# Patient Record
Sex: MALE | Race: WHITE | NOT HISPANIC OR LATINO
[De-identification: names, ages, dates, MRNs, and addresses within clinical notes are randomized per-mention and may not be internally consistent; named-entity substitution may affect disease eponyms.]

---

## 2021-03-17 ENCOUNTER — TRANSCRIPTION ENCOUNTER (OUTPATIENT)
Age: 71
End: 2021-03-17

## 2022-04-05 ENCOUNTER — APPOINTMENT (OUTPATIENT)
Dept: ORTHOPEDIC SURGERY | Facility: CLINIC | Age: 72
End: 2022-04-05
Payer: MEDICARE

## 2022-04-05 VITALS — BODY MASS INDEX: 32.2 KG/M2 | WEIGHT: 230 LBS | HEIGHT: 71 IN

## 2022-04-05 DIAGNOSIS — E78.00 PURE HYPERCHOLESTEROLEMIA, UNSPECIFIED: ICD-10-CM

## 2022-04-05 DIAGNOSIS — I10 ESSENTIAL (PRIMARY) HYPERTENSION: ICD-10-CM

## 2022-04-05 PROCEDURE — 73030 X-RAY EXAM OF SHOULDER: CPT | Mod: RT

## 2022-04-05 PROCEDURE — J3490M: CUSTOM

## 2022-04-05 PROCEDURE — 73562 X-RAY EXAM OF KNEE 3: CPT | Mod: RT

## 2022-04-05 PROCEDURE — 20611 DRAIN/INJ JOINT/BURSA W/US: CPT | Mod: 50

## 2022-04-05 PROCEDURE — 99215 OFFICE O/P EST HI 40 MIN: CPT | Mod: 25

## 2022-04-05 RX ORDER — PANTOPRAZOLE SODIUM 40 MG/1
40 GRANULE, DELAYED RELEASE ORAL
Refills: 0 | Status: ACTIVE | COMMUNITY

## 2022-04-05 RX ORDER — EZETIMIBE 10 MG/1
10 TABLET ORAL
Refills: 0 | Status: ACTIVE | COMMUNITY

## 2022-04-05 RX ORDER — OLMESARTAN MEDOXOMIL 20 MG/1
20 TABLET, FILM COATED ORAL
Refills: 0 | Status: ACTIVE | COMMUNITY

## 2022-04-05 NOTE — REASON FOR VISIT
[FreeTextEntry2] : pt is here today for a follow up of his left shoulder. pt states the pain has gotten worse. pt states his level of pain can get to a 10 but right now it is a 5.

## 2022-04-05 NOTE — PHYSICAL EXAM
[4 ___] : forward flexion 4[unfilled]/5 [4___] : internal rotation 4[unfilled]/5 [TWNoteComboBox7] : active forward flexion 160 degrees [TWNoteComboBox6] : internal rotation 50 degrees [Bilateral] : shoulder bilaterally [Degenerative change] : Degenerative change [5___] : hamstring 5[unfilled]/5 [] : patient ambulates without assistive device [Right] : right knee [FreeTextEntry9] : 4 views reviewed which show some early lucencies around components

## 2022-04-05 NOTE — DISCUSSION/SUMMARY
[Medication Risks Reviewed] : Medication risks reviewed [Surgical risks reviewed] : Surgical risks reviewed [de-identified] : luis manuel rtc arthropathy discussed injeciotn to decreaseinfalmamitona dn pain \par knee would get anmri and boen scn to eval for losening \par

## 2022-04-05 NOTE — DISCUSSION/SUMMARY
[Medication Risks Reviewed] : Medication risks reviewed [Surgical risks reviewed] : Surgical risks reviewed [de-identified] : luis manuel rtc arthropathy discussed injeciotn to decreaseinfalmamitona dn pain \par knee would get anmri and boen scn to eval for losening \par

## 2022-04-05 NOTE — PROCEDURE
[Large Joint Injection] : Large joint injection [Shoulder] : shoulder [Call if redness, pain or fever occur] : call if redness, pain or fever occur [Apply ice for 15min out of every hour for the next 12-24 hours as tolerated] : apply ice for 15 minutes out of every hour for the next 12-24 hours as tolerated [Precise injection in area of tear] : precise injection in area of tear [Pain] : pain [Betadine] : betadine [Ethyl Chloride sprayed topically] : ethyl chloride sprayed topically [Sterile technique used] : sterile technique used [Bilateral] : bilaterally of the [Subacromial Space] : subacromial space [___ cc    6mg] :  Betamethasone (Celestone) ~Vcc of 6mg [___ cc    0.5%] : Bupivacaine (Marcaine) ~Vcc of 0.5%  [Inflammation] : inflammation [Visualize tear] : visualize tear

## 2022-04-06 ENCOUNTER — TRANSCRIPTION ENCOUNTER (OUTPATIENT)
Age: 72
End: 2022-04-06

## 2022-04-06 ENCOUNTER — APPOINTMENT (OUTPATIENT)
Dept: MRI IMAGING | Facility: CLINIC | Age: 72
End: 2022-04-06
Payer: MEDICARE

## 2022-04-06 PROCEDURE — 73721 MRI JNT OF LWR EXTRE W/O DYE: CPT | Mod: RT,MH

## 2022-04-11 ENCOUNTER — RESULT REVIEW (OUTPATIENT)
Age: 72
End: 2022-04-11

## 2022-04-27 ENCOUNTER — APPOINTMENT (OUTPATIENT)
Dept: ORTHOPEDIC SURGERY | Facility: CLINIC | Age: 72
End: 2022-04-27
Payer: MEDICARE

## 2022-04-27 VITALS — HEIGHT: 71 IN | BODY MASS INDEX: 32.2 KG/M2 | WEIGHT: 230 LBS

## 2022-04-27 PROCEDURE — 99213 OFFICE O/P EST LOW 20 MIN: CPT

## 2022-04-27 PROCEDURE — 99214 OFFICE O/P EST MOD 30 MIN: CPT

## 2022-04-29 NOTE — PHYSICAL EXAM
[Bilateral] : shoulder bilaterally [4 ___] : forward flexion 4[unfilled]/5 [4___] : internal rotation 4[unfilled]/5 [TWNoteComboBox7] : active forward flexion 160 degrees [TWNoteComboBox6] : internal rotation 50 degrees [Right] : right knee [5___] : hamstring 5[unfilled]/5 [] : non-antalgic

## 2022-04-29 NOTE — DISCUSSION/SUMMARY
[Medication Risks Reviewed] : Medication risks reviewed [Surgical risks reviewed] : Surgical risks reviewed [de-identified] : knee looks good, had discussion about balloon arthroplasty for shoulder and risks of surgery, will think about it and discuss on return, new exrays at that time to determine if good candidate\par The risks and benefits of surgery have been discussed. Risks include but are not limited to bleeding, infection, reaction to anesthesia, injury to blood vessels and nerves, malunion, nonunion, DVT, PE, necessity of repeat surgery, chronic pain, loss of limb and death. The patient understands the risks and has chosen to proceed with conservative care. All questions have been answered.\par follow up 6 weeks sooner if any issue\par

## 2022-04-29 NOTE — DATA REVIEWED
[MRI] : MRI [Left] : left [Knee] : knee [I independently reviewed and interpreted images and report] : I independently reviewed and interpreted images and report [FreeTextEntry1] : no significant abnormality

## 2022-04-29 NOTE — HISTORY OF PRESENT ILLNESS
[de-identified] : Here for follow up on the MRI results right knee. Feeling ok, some discomfort after golfing, more on the lateral side of the knee.

## 2022-06-22 ENCOUNTER — APPOINTMENT (OUTPATIENT)
Dept: ORTHOPEDIC SURGERY | Facility: CLINIC | Age: 72
End: 2022-06-22

## 2022-07-13 ENCOUNTER — NON-APPOINTMENT (OUTPATIENT)
Age: 72
End: 2022-07-13

## 2022-07-13 ENCOUNTER — APPOINTMENT (OUTPATIENT)
Dept: ORTHOPEDIC SURGERY | Facility: CLINIC | Age: 72
End: 2022-07-13
Payer: MEDICARE

## 2022-07-13 VITALS — HEIGHT: 71 IN | BODY MASS INDEX: 32.2 KG/M2 | WEIGHT: 230 LBS

## 2022-07-13 DIAGNOSIS — M19.012 PRIMARY OSTEOARTHRITIS, RIGHT SHOULDER: ICD-10-CM

## 2022-07-13 DIAGNOSIS — M19.011 PRIMARY OSTEOARTHRITIS, RIGHT SHOULDER: ICD-10-CM

## 2022-07-13 DIAGNOSIS — M25.512 PAIN IN RIGHT SHOULDER: ICD-10-CM

## 2022-07-13 DIAGNOSIS — M25.511 PAIN IN RIGHT SHOULDER: ICD-10-CM

## 2022-07-13 DIAGNOSIS — M19.019 PRIMARY OSTEOARTHRITIS, UNSPECIFIED SHOULDER: ICD-10-CM

## 2022-07-13 PROCEDURE — 99214 OFFICE O/P EST MOD 30 MIN: CPT | Mod: 25

## 2022-07-13 PROCEDURE — 20611 DRAIN/INJ JOINT/BURSA W/US: CPT | Mod: 50

## 2022-07-13 PROCEDURE — 99072 ADDL SUPL MATRL&STAF TM PHE: CPT | Mod: 1L

## 2022-07-13 PROCEDURE — J3490M: CUSTOM | Mod: 1L

## 2022-07-13 RX ORDER — AMOXICILLIN AND CLAVULANATE POTASSIUM 875; 125 MG/1; MG/1
875-125 TABLET, COATED ORAL
Qty: 14 | Refills: 0 | Status: DISCONTINUED | COMMUNITY
Start: 2022-01-31

## 2022-07-13 RX ORDER — METHYLPREDNISOLONE 4 MG/1
4 TABLET ORAL
Qty: 21 | Refills: 0 | Status: DISCONTINUED | COMMUNITY
Start: 2022-01-31

## 2022-07-13 RX ORDER — EVOLOCUMAB 140 MG/ML
140 INJECTION, SOLUTION SUBCUTANEOUS
Refills: 0 | Status: ACTIVE | COMMUNITY

## 2022-07-13 RX ORDER — ROSUVASTATIN CALCIUM 40 MG/1
40 TABLET, FILM COATED ORAL
Refills: 0 | Status: DISCONTINUED | COMMUNITY
End: 2022-07-13

## 2022-09-26 ENCOUNTER — LABORATORY RESULT (OUTPATIENT)
Age: 72
End: 2022-09-26

## 2022-09-28 RX ORDER — OXYCODONE AND ACETAMINOPHEN 10; 325 MG/1; MG/1
10-325 TABLET ORAL
Qty: 40 | Refills: 0 | Status: ACTIVE | COMMUNITY
Start: 2022-09-28

## 2022-09-29 ENCOUNTER — APPOINTMENT (OUTPATIENT)
Age: 72
End: 2022-09-29
Payer: MEDICARE

## 2022-09-29 PROCEDURE — 29828 SHO ARTHRS SRG BICP TENODSIS: CPT | Mod: LT

## 2022-09-29 PROCEDURE — 29823 SHO ARTHRS SRG XTNSV DBRDMT: CPT | Mod: AS,LT,ACP

## 2022-09-29 PROCEDURE — 29828 SHO ARTHRS SRG BICP TENODSIS: CPT | Mod: ACP

## 2022-09-29 PROCEDURE — 29823 SHO ARTHRS SRG XTNSV DBRDMT: CPT | Mod: LT

## 2022-09-29 PROCEDURE — 29999 UNLISTED PX ARTHROSCOPY: CPT

## 2022-09-29 PROCEDURE — 29999 UNLISTED PX ARTHROSCOPY: CPT | Mod: ACP,AS

## 2022-10-05 ENCOUNTER — APPOINTMENT (OUTPATIENT)
Dept: ORTHOPEDIC SURGERY | Facility: CLINIC | Age: 72
End: 2022-10-05

## 2022-10-05 VITALS — BODY MASS INDEX: 32.2 KG/M2 | WEIGHT: 230 LBS | HEIGHT: 71 IN

## 2022-10-05 PROCEDURE — 99024 POSTOP FOLLOW-UP VISIT: CPT

## 2022-10-06 RX ORDER — CEPHALEXIN 500 MG/1
500 CAPSULE ORAL 4 TIMES DAILY
Qty: 40 | Refills: 0 | Status: ACTIVE | COMMUNITY
Start: 2022-10-06 | End: 1900-01-01

## 2022-10-09 NOTE — DISCUSSION/SUMMARY
[de-identified] : 1st post op RTC repair/replacement with balloon 9/29- wound clean, dry and intact, no drainage, normal appearance, neuro and vascular exam normal, skin intact and normal healing  lateral portal some fluid no erythema want it to drain will call or text me if any issues \par sutures and steristrips applied \par went over the op report and discussed the next post operative steps including the precautions patient should take for normal healing \par will start physical therapy next week \par \par

## 2022-10-09 NOTE — HISTORY OF PRESENT ILLNESS
[de-identified] : patient is here for 1st po Visit of the left shoulder. pt had arthroscopy RTC repair  sx in the left shoulder on 9/29/22. the pain is the worse during the night. pt has been taking Aleve to help with the pain in the right shoulder.   pt has been having trouble sleeping due to the pain in the left shoulder.

## 2022-10-10 ENCOUNTER — APPOINTMENT (OUTPATIENT)
Dept: ORTHOPEDIC SURGERY | Facility: CLINIC | Age: 72
End: 2022-10-10

## 2022-10-10 VITALS — BODY MASS INDEX: 32.2 KG/M2 | HEIGHT: 71 IN | WEIGHT: 230 LBS

## 2022-10-10 PROCEDURE — 20610 DRAIN/INJ JOINT/BURSA W/O US: CPT

## 2022-10-10 PROCEDURE — 99024 POSTOP FOLLOW-UP VISIT: CPT

## 2022-10-11 NOTE — PHYSICAL EXAM
[Left] : left shoulder [] : sero-sanguinous drainage [FreeTextEntry3] : lateral portal still prominent and feels like fluid underneath but no redness or other signs of infection , stopped draining

## 2022-10-11 NOTE — DISCUSSION/SUMMARY
[Surgical risks reviewed] : Surgical risks reviewed [de-identified] :  RTC repair sx in the left shoulder on 9/29/22- normal course with good progress however there was history of drainage so i aspirated portal and was not able to get anything , no collection though did drain sero sanguous fluid after apiration, will continue keflex for prophylaxis and follow next week, sooner if any change\par \par cont with the physical therapy\par follow up in 1 month unless the wound starts to drain again

## 2022-10-11 NOTE — PROCEDURE
[Large Joint Injection] : Large joint injection [Left] : of the left [Shoulder] : shoulder [Pain] : pain [Inflammation] : inflammation [Betadine] : betadine [Effusion] : effusion [Call if redness, pain or fever occur] : call if redness, pain or fever occur [Apply ice for 15min out of every hour for the next 12-24 hours as tolerated] : apply ice for 15 minutes out of every hour for the next 12-24 hours as tolerated [Patient was advised to rest the joint(s) for ____ days] : patient was advised to rest the joint(s) for [unfilled] days [de-identified] : 0

## 2022-10-11 NOTE — HISTORY OF PRESENT ILLNESS
[de-identified] : patient is here for 2nd po Visit of the left shoulder. pt had arthroscopy RTC repair sx in the left shoulder on 9/29/22.pt has an infection in the left shoulder where sx was performed. the pain in the left shoulder has not been too great due to certain movements and cause  the  pain to be worse.pt has started doing pt. pt has been icing  the left shoulder. pt has been having trouble sleeping at night due to the pain in  the left shoulder.

## 2022-10-19 ENCOUNTER — APPOINTMENT (OUTPATIENT)
Dept: ORTHOPEDIC SURGERY | Facility: CLINIC | Age: 72
End: 2022-10-19

## 2022-10-19 VITALS — BODY MASS INDEX: 32.2 KG/M2 | HEIGHT: 71 IN | WEIGHT: 230 LBS

## 2022-10-19 PROCEDURE — 99024 POSTOP FOLLOW-UP VISIT: CPT

## 2022-10-23 NOTE — PHYSICAL EXAM
[Left] : left shoulder [] : no purulent drainage [FreeTextEntry3] : lateral portal still prominent and feels like fluid underneath but no redness or other signs of infection , stopped draining

## 2022-10-23 NOTE — DISCUSSION/SUMMARY
[de-identified] : 9/29/22 rtc repair/ replacement w/ balloon - normal course with good progress and no evidence of infection, continue rehab and appropriate nsaids\par patient has been going to physical therapy and is working his range of motion, patient is still not able to rotate. \par follow 4 weeks

## 2022-10-23 NOTE — HISTORY OF PRESENT ILLNESS
[de-identified] : patient is here for a post op visit on te left shoulder. patient is feeling good and has no issues. patient notes weakness but pain is fine. patient notes the incision from the aspiration last visit has closed up and is fine now. patient also notes therapy is going well. 9/29/22 rtc repair/ replacement w/ balloon

## 2022-11-15 ENCOUNTER — APPOINTMENT (OUTPATIENT)
Dept: ORTHOPEDIC SURGERY | Facility: CLINIC | Age: 72
End: 2022-11-15

## 2022-11-15 VITALS — HEIGHT: 71 IN | WEIGHT: 230 LBS | BODY MASS INDEX: 32.2 KG/M2

## 2022-11-15 PROCEDURE — 20606 DRAIN/INJ JOINT/BURSA W/US: CPT | Mod: 58,LT

## 2022-11-15 PROCEDURE — 99024 POSTOP FOLLOW-UP VISIT: CPT

## 2022-11-15 PROCEDURE — J3490N: CUSTOM

## 2022-11-15 NOTE — PROCEDURE
[Medium Joint Injection] : medium joint injection [Left] : of the left [Inflammation] : inflammation [X-ray evidence of Osteoarthritis on this or prior visit] : x-ray evidence of Osteoarthritis on this or prior visit [Betadine] : betadine [Ethyl Chloride sprayed topically] : ethyl chloride sprayed topically [Sterile technique used] : sterile technique used [___ cc    3mg] :  Betamethasone (Celestone) ~Vcc of 3mg [___ cc    0.25%] : Bupivacaine (Marcaine) ~Vcc of 0.25%  [] : Patient tolerated procedure well [Call if redness, pain or fever occur] : call if redness, pain or fever occur [Apply ice for 15min out of every hour for the next 12-24 hours as tolerated] : apply ice for 15 minutes out of every hour for the next 12-24 hours as tolerated [Previous OTC use and PT nontherapeutic] : patient has tried OTC's including aspirin, Ibuprofen, Aleve, etc or prescription NSAIDS, and/or exercises at home and/or physical therapy without satisfactory response [Risks, benefits, alternatives discussed / Verbal consent obtained] : the risks benefits, and alternatives have been discussed, and verbal consent was obtained [Precise injection in area of tear] : precise injection in area of tear [All ultrasound images have been permanently captured and stored accordingly in our picture archiving and communication system] : All ultrasound images have been permanently captured and stored accordingly in our picture archiving and communication system [Visualization of the needle and placement of injection was performed without complication] : visualization of the needle and placement of injection was performed without complication [Pain] : pain [AC Joint] : ac joint

## 2022-11-19 NOTE — HISTORY OF PRESENT ILLNESS
[de-identified] : Pt is here for a post op visit from sx on 09/29/22 - left shoulder RTC repair/replacement with balloon. Pt is currently doing physical therapy at [facility] in [place] 2x a week, and finds it does assist pain. Pt states the burning sensation in shoulder has no improved. Pt states pain continues to radiates down to elbow, and to left shoulder blade. Pt states discomfort while sleeping, and unable to sleep on left side. Pt states ROM has not improved. Pt is not taking medications, and ices after activity.

## 2022-11-19 NOTE — DISCUSSION/SUMMARY
[de-identified] : 9/29/22 rtc repair/ replacement w/ balloon - normal course with good progress and no evidence of infection, continue rehab and appropriate nsaids\par patient has been going to physical therapy but does not see improvement in his range of motion, he is stiff therefore recommend injecting into the AC joint today to help relieve inflammation and stiffness hopefully to improve his rom in therapy \par \par \par The risks, benefits and contents of the injection have been discussed.  Risks include but are not limited to allergic reaction, flare reaction, permanent white skin discoloration at the injection site and infection.  The patient understands the risks and agrees to having the injection.  All questions have been answered.\par LT AC joint Discussed the timing of the injections and the follow up that is needed. Advised the pt to ice the area that was injected and informed the pt it may take a few days for the injection to give relief.\par \par encourage him to cont with the therapy and precautions \par follow up 4 weeks

## 2022-12-12 ENCOUNTER — APPOINTMENT (OUTPATIENT)
Dept: ORTHOPEDIC SURGERY | Facility: CLINIC | Age: 72
End: 2022-12-12
Payer: OTHER MISCELLANEOUS

## 2022-12-12 VITALS — BODY MASS INDEX: 32.2 KG/M2 | WEIGHT: 230 LBS | HEIGHT: 71 IN

## 2022-12-12 PROCEDURE — 73030 X-RAY EXAM OF SHOULDER: CPT | Mod: LT

## 2022-12-12 PROCEDURE — 20611 DRAIN/INJ JOINT/BURSA W/US: CPT | Mod: 58,LT

## 2022-12-12 PROCEDURE — 99024 POSTOP FOLLOW-UP VISIT: CPT

## 2022-12-12 PROCEDURE — J3490N: CUSTOM | Mod: NC

## 2022-12-12 NOTE — HISTORY OF PRESENT ILLNESS
[de-identified] : patient is here for   apo visit of the left shoulder RTC repair/with balloon  DOS: 9/29/22.  the pain in the left shoulder has been bad. pt has been using icyhot and aleve to help alleviate the  pain. pt stopped  doing physical therapy last week. the ROM has not improved.

## 2022-12-12 NOTE — DISCUSSION/SUMMARY
[de-identified] : RTC repair/with balloon  DOS: 9/29/22.- he has passive ROM but lacking in strength \par discussed risks and benefits of visco injections into the joint, disussed potential shoulder repleaceent but will hold off \par \par evaluated shoulder and decided to proceed with synvisc injections, discussed future treatment and option, will proceed, discussed possible surgery vs alternatives in future\par The risks, benefits and contents of the injection have been discussed.  Risks include but are not limited to allergic reaction, flare reaction, permanent white skin discoloration at the injection site and infection.  The patient understands the risks and agrees to having the injection.  All questions have been answered.\par \par LT shoulder GH joint Synvisc #1 Discussed the timing of the injections and the follow up that is needed. Advised the pt to ice the area that was injected and informed the pt it may take a few days for the injection to give relief.\par follow up in 1 week

## 2022-12-12 NOTE — PROCEDURE
[Medium Joint Injection] : medium joint injection [Left] : of the left [Inflammation] : inflammation [X-ray evidence of Osteoarthritis on this or prior visit] : x-ray evidence of Osteoarthritis on this or prior visit [Betadine] : betadine [Ethyl Chloride sprayed topically] : ethyl chloride sprayed topically [Sterile technique used] : sterile technique used [___ cc    3mg] :  Betamethasone (Celestone) ~Vcc of 3mg [___ cc    0.25%] : Bupivacaine (Marcaine) ~Vcc of 0.25%  [Synvisc (16mg)] : 16mg of Synvisc [#1] : series #1 [] : Patient tolerated procedure well [Call if redness, pain or fever occur] : call if redness, pain or fever occur [Apply ice for 15min out of every hour for the next 12-24 hours as tolerated] : apply ice for 15 minutes out of every hour for the next 12-24 hours as tolerated [Previous OTC use and PT nontherapeutic] : patient has tried OTC's including aspirin, Ibuprofen, Aleve, etc or prescription NSAIDS, and/or exercises at home and/or physical therapy without satisfactory response [Risks, benefits, alternatives discussed / Verbal consent obtained] : the risks benefits, and alternatives have been discussed, and verbal consent was obtained [Precise injection in area of tear] : precise injection in area of tear [All ultrasound images have been permanently captured and stored accordingly in our picture archiving and communication system] : All ultrasound images have been permanently captured and stored accordingly in our picture archiving and communication system [Visualization of the needle and placement of injection was performed without complication] : visualization of the needle and placement of injection was performed without complication [Pain] : pain [Glenohumeral Joint] : glenohumeral joint

## 2022-12-19 ENCOUNTER — APPOINTMENT (OUTPATIENT)
Dept: ORTHOPEDIC SURGERY | Facility: CLINIC | Age: 72
End: 2022-12-19
Payer: MEDICARE

## 2022-12-19 VITALS — BODY MASS INDEX: 32.2 KG/M2 | WEIGHT: 230 LBS | HEIGHT: 71 IN

## 2022-12-19 DIAGNOSIS — Z96.651 PAIN DUE TO INTERNAL ORTHOPEDIC PROSTHETIC DEVICES, IMPLANTS AND GRAFTS, INITIAL ENCOUNTER: ICD-10-CM

## 2022-12-19 DIAGNOSIS — T84.84XA PAIN DUE TO INTERNAL ORTHOPEDIC PROSTHETIC DEVICES, IMPLANTS AND GRAFTS, INITIAL ENCOUNTER: ICD-10-CM

## 2022-12-19 DIAGNOSIS — Z00.00 ENCOUNTER FOR GENERAL ADULT MEDICAL EXAMINATION W/OUT ABNORMAL FINDINGS: ICD-10-CM

## 2022-12-19 PROCEDURE — 99214 OFFICE O/P EST MOD 30 MIN: CPT | Mod: 24,25

## 2022-12-19 PROCEDURE — 20611 DRAIN/INJ JOINT/BURSA W/US: CPT | Mod: 58,LT

## 2022-12-19 NOTE — PROCEDURE
[Medium Joint Injection] : medium joint injection [Left] : of the left [Glenohumeral Joint] : glenohumeral joint [Inflammation] : inflammation [X-ray evidence of Osteoarthritis on this or prior visit] : x-ray evidence of Osteoarthritis on this or prior visit [Betadine] : betadine [Ethyl Chloride sprayed topically] : ethyl chloride sprayed topically [Sterile technique used] : sterile technique used [___ cc    3mg] :  Betamethasone (Celestone) ~Vcc of 3mg [___ cc    0.25%] : Bupivacaine (Marcaine) ~Vcc of 0.25%  [Synvisc (16mg)] : 16mg of Synvisc [#2] : series #2 [] : Patient tolerated procedure well [Call if redness, pain or fever occur] : call if redness, pain or fever occur [Apply ice for 15min out of every hour for the next 12-24 hours as tolerated] : apply ice for 15 minutes out of every hour for the next 12-24 hours as tolerated [Previous OTC use and PT nontherapeutic] : patient has tried OTC's including aspirin, Ibuprofen, Aleve, etc or prescription NSAIDS, and/or exercises at home and/or physical therapy without satisfactory response [Risks, benefits, alternatives discussed / Verbal consent obtained] : the risks benefits, and alternatives have been discussed, and verbal consent was obtained [Precise injection in area of tear] : precise injection in area of tear [All ultrasound images have been permanently captured and stored accordingly in our picture archiving and communication system] : All ultrasound images have been permanently captured and stored accordingly in our picture archiving and communication system [Visualization of the needle and placement of injection was performed without complication] : visualization of the needle and placement of injection was performed without complication [Pain] : pain

## 2022-12-22 PROBLEM — T84.84XA PAINFUL TOTAL KNEE REPLACEMENT, RIGHT: Status: ACTIVE | Noted: 2022-04-05

## 2022-12-22 PROBLEM — Z00.00 ENCOUNTER FOR PREVENTIVE HEALTH EXAMINATION: Status: ACTIVE | Noted: 2022-04-01

## 2022-12-22 NOTE — DISCUSSION/SUMMARY
[de-identified] : RTC repair/with balloon  DOS: 9/29/22.- he has passive ROM but lacking in strength \par discussed risks and benefits of visco injections into the joint, disussed potential shoulder repleaceent but will hold off \par \par evaluated shoulder and decided to proceed with synvisc injections, discussed future treatment and option, will proceed, discussed possible surgery vs alternatives in future\par The risks, benefits and contents of the injection have been discussed.  Risks include but are not limited to allergic reaction, flare reaction, permanent white skin discoloration at the injection site and infection.  The patient understands the risks and agrees to having the injection.  All questions have been answered.\par \par LT shoulder GH joint Synvisc #1 Discussed the timing of the injections and the follow up that is needed. Advised the pt to ice the area that was injected and informed the pt it may take a few days for the injection to give relief.\par follow up in 1 week

## 2022-12-22 NOTE — HISTORY OF PRESENT ILLNESS
[de-identified] : Pt is here for a possible injection to the left shoulder. Had RTC repair/with balloon on 09/29/22. Pain has not improved since last visit. Noticed slight relief with Synvisc injection. ROM is not improving, limited to movement. Pain is most prevalent with extending arm, and sudden movements. Takes Aleve and uses icy hot when needed. Pt would like to proceed with Synvisc Injection #2 today to left shoulder.

## 2022-12-27 ENCOUNTER — APPOINTMENT (OUTPATIENT)
Dept: ORTHOPEDIC SURGERY | Facility: CLINIC | Age: 72
End: 2022-12-27
Payer: MEDICARE

## 2022-12-27 VITALS — HEIGHT: 71 IN | BODY MASS INDEX: 32.2 KG/M2 | WEIGHT: 230 LBS

## 2022-12-27 PROCEDURE — 99024 POSTOP FOLLOW-UP VISIT: CPT

## 2022-12-27 PROCEDURE — 20611 DRAIN/INJ JOINT/BURSA W/US: CPT | Mod: 58,LT

## 2022-12-27 NOTE — PROCEDURE
[Medium Joint Injection] : medium joint injection [Left] : of the left [Glenohumeral Joint] : glenohumeral joint [Inflammation] : inflammation [X-ray evidence of Osteoarthritis on this or prior visit] : x-ray evidence of Osteoarthritis on this or prior visit [Betadine] : betadine [Ethyl Chloride sprayed topically] : ethyl chloride sprayed topically [Sterile technique used] : sterile technique used [___ cc    3mg] :  Betamethasone (Celestone) ~Vcc of 3mg [___ cc    0.25%] : Bupivacaine (Marcaine) ~Vcc of 0.25%  [Synvisc (16mg)] : 16mg of Synvisc [#3] : series #3 [] : Patient tolerated procedure well [Call if redness, pain or fever occur] : call if redness, pain or fever occur [Apply ice for 15min out of every hour for the next 12-24 hours as tolerated] : apply ice for 15 minutes out of every hour for the next 12-24 hours as tolerated [Previous OTC use and PT nontherapeutic] : patient has tried OTC's including aspirin, Ibuprofen, Aleve, etc or prescription NSAIDS, and/or exercises at home and/or physical therapy without satisfactory response [Risks, benefits, alternatives discussed / Verbal consent obtained] : the risks benefits, and alternatives have been discussed, and verbal consent was obtained [Precise injection in area of tear] : precise injection in area of tear [All ultrasound images have been permanently captured and stored accordingly in our picture archiving and communication system] : All ultrasound images have been permanently captured and stored accordingly in our picture archiving and communication system [Visualization of the needle and placement of injection was performed without complication] : visualization of the needle and placement of injection was performed without complication [Pain] : pain

## 2023-01-23 PROBLEM — M19.011 ARTHRITIS OF BOTH SHOULDER REGIONS: Status: ACTIVE | Noted: 2022-07-13

## 2023-01-23 PROBLEM — M19.019 SHOULDER ARTHRITIS: Status: ACTIVE | Noted: 2023-01-23

## 2023-01-23 PROBLEM — M25.511 SHOULDER PAIN, BILATERAL: Status: ACTIVE | Noted: 2022-04-05

## 2023-01-23 NOTE — DISCUSSION/SUMMARY
[Medication Risks Reviewed] : Medication risks reviewed [Surgical risks reviewed] : Surgical risks reviewed [de-identified] : had an extensive discussion on balloon scope procedure for righ shoulders including the risks and benefits and the recovery process. both shoulders will have to be done a few months apart, start with the right. , understands he is not ideal candidate and this is a relatively new procedure however alternative is a reverse shoulder replacement and that has high morbidity\par The risks and benefits of surgery have been discussed. Risks include but are not limited to bleeding, infection, reaction to anesthesia, injury to blood vessels and nerves, malunion, nonunion, DVT, PE, necessity of repeat surgery, chronic pain, loss of limb and death. The patient understands the risks and agrees with the surgical plan. All questions have been answered.\par \par The risks, benefits and contents of the injection have been discussed.  Risks include but are not limited to allergic reaction, flare reaction, permanent white skin discoloration at the injection site and infection.  The patient understands the risks and agrees to having the injection.  All questions have been answered.\par celestone in [facility] Discussed the timing of the injections and the follow up that is needed. Advised the pt to ice the area that was injected and informed the pt it may take a few days for the injection to give relief.\par

## 2023-01-23 NOTE — HISTORY OF PRESENT ILLNESS
[de-identified] : patient is here for folow up ans possibly another celestone injection in right shoulders. last injections were in April. patient claims  injeciton helps for some time. patient claims to get about 2 months of relief from celestone injections.

## 2023-01-23 NOTE — PHYSICAL EXAM
[Left] : left shoulder [Sitting] : sitting [Moderate] : moderate [4 ___] : forward flexion 4[unfilled]/5 [4___] : internal rotation 4[unfilled]/5 [TWNoteComboBox7] : active forward flexion 160 degrees [TWNoteComboBox6] : internal rotation 50 degrees [Right] : right knee [5___] : hamstring 5[unfilled]/5 [] : patient ambulates without assistive device

## 2023-01-23 NOTE — PHYSICAL EXAM
[Bilateral] : shoulder bilaterally [4 ___] : forward flexion 4[unfilled]/5 [4___] : internal rotation 4[unfilled]/5 [5___] : hamstring 5[unfilled]/5 [Right] : right shoulder [Sitting] : sitting [Moderate] : moderate [TWNoteComboBox7] : active forward flexion 160 degrees [TWNoteComboBox6] : internal rotation 50 degrees [] : non-antalgic

## 2023-01-23 NOTE — DISCUSSION/SUMMARY
[de-identified] : RTC repair/with balloon  DOS: 9/29/22.- he has passive ROM but lacking in strength \par discussed risks and benefits of visco injections into the joint, disussed potential shoulder repleaceent but will hold off \par \par evaluated shoulder and decided to proceed with synvisc injections, discussed future treatment and option, will proceed, discussed possible surgery vs alternatives in future\par The risks, benefits and contents of the injection have been discussed.  Risks include but are not limited to allergic reaction, flare reaction, permanent white skin discoloration at the injection site and infection.  The patient understands the risks and agrees to having the injection.  All questions have been answered.\par \par LT shoulder GH joint Synvisc #3 Discussed the timing of the injections and the follow up that is needed. Advised the pt to ice the area that was injected and informed the pt it may take a few days for the injection to give relief.\par \par f/up in 6-8 weeks, discussed possible csi vs. zilretta. Also discussed possible future RSA.

## 2023-01-23 NOTE — HISTORY OF PRESENT ILLNESS
[de-identified] : Patient is here for a follow up of the left shoulder. Pt notes no improvement in the left shoulder since last visit. Pt is having constant pain in the left shoulder. Pt had Synvisc Injection #2 in the left shoulder last visit. Pt had RTC repair/with balloon 9/29/22. \par Pt would like to continue with Synvisc Injection #3 in the left shoulder today.

## 2023-01-23 NOTE — PROCEDURE
[Large Joint Injection] : Large joint injection [Bilateral] : bilaterally of the [Subacromial Space] : subacromial space [Inflammation] : inflammation [Betadine] : betadine [Ethyl Chloride sprayed topically] : ethyl chloride sprayed topically [Sterile technique used] : sterile technique used [___ cc    3mg] :  Betamethasone (Celestone) ~Vcc of 3mg [___ cc    0.25%] : Bupivacaine (Marcaine) ~Vcc of 0.25%  [] : Patient tolerated procedure well [Call if redness, pain or fever occur] : call if redness, pain or fever occur [Apply ice for 15min out of every hour for the next 12-24 hours as tolerated] : apply ice for 15 minutes out of every hour for the next 12-24 hours as tolerated [Previous OTC use and PT nontherapeutic] : patient has tried OTC's including aspirin, Ibuprofen, Aleve, etc or prescription NSAIDS, and/or exercises at home and/or physical therapy without satisfactory response [Risks, benefits, alternatives discussed / Verbal consent obtained] : the risks benefits, and alternatives have been discussed, and verbal consent was obtained [Precise injection in area of tear] : precise injection in area of tear [All ultrasound images have been permanently captured and stored accordingly in our picture archiving and communication system] : All ultrasound images have been permanently captured and stored accordingly in our picture archiving and communication system [Visualization of the needle and placement of injection was performed without complication] : visualization of the needle and placement of injection was performed without complication [Pain] : pain [Right] : of the right

## 2023-01-23 NOTE — DISCUSSION/SUMMARY
[Medication Risks Reviewed] : Medication risks reviewed [Surgical risks reviewed] : Surgical risks reviewed [de-identified] : had an extensive discussion on balloon scope procedure for left shoulders including the risks and benefits and the recovery process. both shoulders will have to be done a few months apart, start with the right. , understands he is not ideal candidate and this is a relatively new procedure however alternative is a reverse shoulder replacement and that has high morbidity\par The risks and benefits of surgery have been discussed. Risks include but are not limited to bleeding, infection, reaction to anesthesia, injury to blood vessels and nerves, malunion, nonunion, DVT, PE, necessity of repeat surgery, chronic pain, loss of limb and death. The patient understands the risks and agrees with the surgical plan. All questions have been answered.\par \par The risks, benefits and contents of the injection have been discussed.  Risks include but are not limited to allergic reaction, flare reaction, permanent white skin discoloration at the injection site and infection.  The patient understands the risks and agrees to having the injection.  All questions have been answered.\par celestone in left SHOULDERS Discussed the timing of the injections and the follow up that is needed. Advised the pt to ice the area that was injected and informed the pt it may take a few days for the injection to give relief.\par

## 2023-01-23 NOTE — HISTORY OF PRESENT ILLNESS
[de-identified] : patient is here for folow up ans possibly another celestone injection in left shoulders. last injections were in April. patient claims  injeciton helps for some time. patient claims to get about 2 months of relief from celestone injections.

## 2023-02-14 ENCOUNTER — APPOINTMENT (OUTPATIENT)
Dept: ORTHOPEDIC SURGERY | Facility: CLINIC | Age: 73
End: 2023-02-14
Payer: MEDICARE

## 2023-02-14 VITALS — BODY MASS INDEX: 32.2 KG/M2 | WEIGHT: 230 LBS | HEIGHT: 71 IN

## 2023-02-14 DIAGNOSIS — Z98.890 OTHER SPECIFIED POSTPROCEDURAL STATES: ICD-10-CM

## 2023-02-14 PROCEDURE — 20611 DRAIN/INJ JOINT/BURSA W/US: CPT

## 2023-02-14 PROCEDURE — J3490M: CUSTOM | Mod: NC

## 2023-02-14 PROCEDURE — 99215 OFFICE O/P EST HI 40 MIN: CPT

## 2023-02-15 ENCOUNTER — FORM ENCOUNTER (OUTPATIENT)
Age: 73
End: 2023-02-15

## 2023-02-15 ENCOUNTER — APPOINTMENT (OUTPATIENT)
Dept: MRI IMAGING | Facility: CLINIC | Age: 73
End: 2023-02-15

## 2023-02-16 ENCOUNTER — APPOINTMENT (OUTPATIENT)
Dept: MRI IMAGING | Facility: CLINIC | Age: 73
End: 2023-02-16
Payer: OTHER MISCELLANEOUS

## 2023-02-16 PROCEDURE — 99072 ADDL SUPL MATRL&STAF TM PHE: CPT

## 2023-02-16 PROCEDURE — 73221 MRI JOINT UPR EXTREM W/O DYE: CPT | Mod: LT

## 2023-02-19 NOTE — PROCEDURE
[Large Joint Injection] : Large joint injection [Left] : of the left [Subacromial Space] : subacromial space [FreeTextEntry1] :  left shoulder subacromial 9/2 injection under US guidance [FreeTextEntry3] : Large joint injection was performed into the subacromial space of left shoulder. An injection of Bupivacaine (Marcaine) cc of 0.5% was used Betamethasone (Celestone) cc of 6mg. \par Patient was advised to call if redness, pain or fever occur and apply ice for 15 minutes out of every hour for the next 12-24 hours as tolerated. \par \par Patient has tried OTC's including aspirin, Ibuprofen, Aleve, etc or prescription NSAIDS, and/or exercises at home and/or physical therapy without satisfactory response and the risks benefits, and alternatives have been discussed, and verbal consent was obtained. \par Ultrasound guidance was indicated for this patient due to precise injection in area of tear. All ultrasound images have been permanently captured and stored accordingly in our picture archiving and communication system. Visualization of the needle and placement of injection was performed without complication. \par The findings showed no evidence of ligament, tendon or muscle tear and no effusion or other fluid collection.

## 2023-02-19 NOTE — HISTORY OF PRESENT ILLNESS
[de-identified] : Patient is here to follow up on left shoulder. Completed Synvisc series on 12/27/22, and felt no relief. Pain is still worse with lifting and extending arm. Not taking medications, icing, or heating. Had RTC repair with balloon implant on 09/29/22.

## 2023-02-19 NOTE — DISCUSSION/SUMMARY
[Medication Risks Reviewed] : Medication risks reviewed [Surgical risks reviewed] : Surgical risks reviewed [de-identified] : end stage rotator cuff arthropathy, needs a reverse shoulder replacement which has high morbidity, discussed risks and benefits of surgery\par We had an extensive discussion regarding total shoulder replacement surgery intervention including risk, benefits and alternatives.  The risks include, but are not limited to infection, bleeding, injury to small nerves and blood vessels, pain, stiffness, phlebitis, DVT and need for secondary procedures.  there are people despite well done surgery who can lose their leg or life as a result of unforeseen complications. Preoperative, intraoperative and postoperative care were discussed and outlined to the patient as well.  I have also talked to the patient about the specific risks of computer assisted surgery and robotic surgery including the reported risk of malalignment, increased injury to neurovascular structures; ligamentous structure with minimally invasive computer assisted surgery as well as iatrogenic injury from the pin sites, risks of fracture around the pins or computer error.  In my opinion the benefits outweigh the risk.  The patient would like to proceed with this.\par will get an mri as a last ditch effort to see if anyway to avoid and understand what balloon has achieved\par prescribed mobic and discussed risks of side effects and timing and management of medication.  side effects can include gi ulcers and irritation as well as kidney failure and bleeding issues\par decided he would take prescription motrin instead\par Tests/Studies Independently Interpreted Today: None \par Tests Ordered: MRI left shoulder \par \par Activity/Work/Sports Status: None \par Additional Instructions: None\par Follow-Up:  After MRI \par \par The risks, benefits and contents of the injection have been discussed.  Risks include but are not limited to allergic reaction, flare reaction, permanent white skin discoloration at the injection site and infection.  The patient understands the risks and agrees to having the injection.  All questions have been answered.\par

## 2023-02-21 ENCOUNTER — APPOINTMENT (OUTPATIENT)
Dept: ORTHOPEDIC SURGERY | Facility: CLINIC | Age: 73
End: 2023-02-21
Payer: OTHER MISCELLANEOUS

## 2023-02-21 VITALS — HEIGHT: 71 IN | BODY MASS INDEX: 32.2 KG/M2 | WEIGHT: 230 LBS

## 2023-02-21 DIAGNOSIS — M19.012 PRIMARY OSTEOARTHRITIS, LEFT SHOULDER: ICD-10-CM

## 2023-02-21 DIAGNOSIS — Z87.891 PERSONAL HISTORY OF NICOTINE DEPENDENCE: ICD-10-CM

## 2023-02-21 PROCEDURE — 99215 OFFICE O/P EST HI 40 MIN: CPT

## 2023-02-21 PROCEDURE — 99072 ADDL SUPL MATRL&STAF TM PHE: CPT

## 2023-02-21 NOTE — PHYSICAL EXAM
[Left] : left shoulder [4___] : external rotation 4[unfilled]/5 [4 ___] : forward flexion 4[unfilled]/5 [] : pain with strength testing [FreeTextEntry9] : FE 80A 150P\par ER 50P

## 2023-02-21 NOTE — ASSESSMENT
[FreeTextEntry1] : L RCTs, GH DJD. H/O 2 prior surgeries by Dr. Cunningham.\par Xrays and advanced imaging reviewed.\par H/O L shoulder arthroscopic balloon spacer 9/29/22.\par Completed synvisc series 12/27/22 and CSI 2/14/23.\par Discussed op versus non op tx, including the r/b/a/c of both.\par Discussed rehab and recovery after RSA.\par He would like to proceed with surgery.\par Request auth for L RSA.\par \par Risks:\par The advantages, disadvantages, complications and alternatives of continued non-operative treatment versus operative treatment were discussed with the patient.   We specifically discussed the risks of bleeding, infection, damage to neurovascular structures, failure of wound healing, wound dehiscence, scaring, failure of arthroplasty, need for revision surgery, shoulder pain, shoulder stiffness, shoulder dislocation and complications of surgery and anesthesia in general including deep venous thrombosis, pulmonary embolism, myocardial infarction, stroke, loss of limb and death.  The patient understood and agreed to proceed.\par \par \par

## 2023-02-21 NOTE — DATA REVIEWED
[MRI] : MRI [Left] : left [Shoulder] : shoulder [I independently reviewed and interpreted images and report] : I independently reviewed and interpreted images and report [FreeTextEntry1] : large retracted RCTs, BASILIO GUIDRY

## 2023-02-21 NOTE — HISTORY OF PRESENT ILLNESS
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 He reports moving a heavy desk at the time and feeling pain in his shoulder. He has been seeing Dr. Cunningham and had 2 prior surgeries. His most recent was arthroscopic balloon spacer on 9/29/22. He stopped doing PT. There is pain with overhead motions. He has pain with reaching and lifting. He had CSI 2/14/23 and completed synvisc 12/27/22. He had updated MRI.\par \par MRI L shoulder:\par 1. Significant interval change with large retracted tears of the supraspinatus, infraspinatus and subscapularis tendons and severe muscle atrophy and severe superior displacement of the humeral head and narrowing of the supraspinatus outlet. \par 2. Moderate glenohumeral joint arthrosis, moderate AC joint arthrosis, moderate effusion, moderate bursitis and distal retraction of the biceps tendon.\par 3. No acute fracture. [] : no [FreeTextEntry1] : L shoulder [FreeTextEntry3] : 11/13/98 [FreeTextEntry5] : public safety - was moving a desk and felt pain in shoulder [de-identified] : MRI, XR [de-identified] : none

## 2023-03-07 ENCOUNTER — APPOINTMENT (OUTPATIENT)
Dept: ORTHOPEDIC SURGERY | Facility: CLINIC | Age: 73
End: 2023-03-07

## 2023-03-16 ENCOUNTER — APPOINTMENT (OUTPATIENT)
Dept: ORTHOPEDIC SURGERY | Facility: CLINIC | Age: 73
End: 2023-03-16
Payer: OTHER MISCELLANEOUS

## 2023-03-16 VITALS — WEIGHT: 230 LBS | BODY MASS INDEX: 32.2 KG/M2 | HEIGHT: 71 IN

## 2023-03-16 DIAGNOSIS — M75.120 COMPLETE ROTATOR CUFF TEAR OR RUPTURE OF UNSPECIFIED SHOULDER, NOT SPECIFIED AS TRAUMATIC: ICD-10-CM

## 2023-03-16 PROCEDURE — 99072 ADDL SUPL MATRL&STAF TM PHE: CPT

## 2023-03-16 PROCEDURE — 99215 OFFICE O/P EST HI 40 MIN: CPT

## 2023-03-17 PROBLEM — M75.120: Status: ACTIVE | Noted: 2022-04-05

## 2023-03-19 NOTE — DISCUSSION/SUMMARY
[de-identified] : Plan at this time is to get authorization for a reverse left total shoulder replacement.  Patient is currently not working.  He has 100% temporary disability to the left shoulder.  His left shoulder pain is causally related to his work accident.  All risks benefits and alternatives of the procedure were discussed the patient detail and he wishes to proceed pending authorization.

## 2023-03-19 NOTE — IMAGING
[de-identified] : Left shoulder exam: The patient presents with no apparent distress. Neurovascularly intact. Sensation intact to left upper extremity. No rashes or abrasions. Previous operative incisions noted. 2+ pulses to the distal radius.Tender to palpation at anterolateral shoulder. .AROM FE 80. Active assist to 140. ABD 80 ER 50 IR L5. 3/5 RC strength. 5/5 Deltoid strength.

## 2023-03-19 NOTE — ASSESSMENT
[FreeTextEntry1] : The patient is a new  patient. He was injured 11/13/1998 and is now retired. He has had multiple RCR surgeries that have now failed. The patient was initially injured lifting a desk onto a pallet truck. His most recent surgery was in Sept. 2022 with Dr. Cunningham. He is here for consideration of a reverse TSR. He has tried gel injections and had a recent steroid injection on 2/14/23. The patient has had no relief. He reports decreased ROM and strength. He reports pain and decreased abillity to complete ADLs. The patient denies new trauma. He denies paresthesias or numbness.

## 2023-03-19 NOTE — DATA REVIEWED
[FreeTextEntry1] : X-rays from [facility] dated December 2022 shows bone-on-bone arthritis of the glenohumeral joint with proximal migration to the undersurface of the acromion.  There are no tumors masses or calcifications seen.

## 2023-03-19 NOTE — HISTORY OF PRESENT ILLNESS
[Work related] : work related [9] : 9 [7] : 7 [Dull/Aching] : dull/aching [Intermittent] : intermittent [Household chores] : household chores [Leisure] : leisure [Nothing helps with pain getting better] : Nothing helps with pain getting better [] : no [FreeTextEntry1] : left shoulder  [FreeTextEntry3] : 11/13/1998 [FreeTextEntry5] : Patient is a 72 year old male who presents for left shoulder pain. Due to work related injury; was a public safety. States on 11/13/1998, was lifting desk, and felt pop. Had RTC repair in 2006. Had RTC repair with balloon on 9/29/22. Has been under the care of Dr. Cunningham recently. Had CSI on 2/14/23 and felt no relief. Completed Synvisc series on 12/17/22. Pain is worse with lifting.  [de-identified] : lifting

## 2023-04-05 ENCOUNTER — FORM ENCOUNTER (OUTPATIENT)
Age: 73
End: 2023-04-05

## 2023-05-07 ENCOUNTER — FORM ENCOUNTER (OUTPATIENT)
Age: 73
End: 2023-05-07

## 2023-06-15 ENCOUNTER — FORM ENCOUNTER (OUTPATIENT)
Age: 73
End: 2023-06-15

## 2023-08-11 ENCOUNTER — OUTPATIENT (OUTPATIENT)
Dept: OUTPATIENT SERVICES | Facility: HOSPITAL | Age: 73
LOS: 1 days | End: 2023-08-11
Payer: COMMERCIAL

## 2023-08-11 ENCOUNTER — RESULT REVIEW (OUTPATIENT)
Age: 73
End: 2023-08-11

## 2023-08-11 VITALS
HEART RATE: 67 BPM | RESPIRATION RATE: 16 BRPM | WEIGHT: 229.94 LBS | SYSTOLIC BLOOD PRESSURE: 123 MMHG | OXYGEN SATURATION: 100 % | TEMPERATURE: 98 F | HEIGHT: 69.5 IN | DIASTOLIC BLOOD PRESSURE: 61 MMHG

## 2023-08-11 DIAGNOSIS — Z98.890 OTHER SPECIFIED POSTPROCEDURAL STATES: Chronic | ICD-10-CM

## 2023-08-11 DIAGNOSIS — Z87.19 PERSONAL HISTORY OF OTHER DISEASES OF THE DIGESTIVE SYSTEM: Chronic | ICD-10-CM

## 2023-08-11 DIAGNOSIS — Z96.652 PRESENCE OF LEFT ARTIFICIAL KNEE JOINT: Chronic | ICD-10-CM

## 2023-08-11 DIAGNOSIS — Z01.818 ENCOUNTER FOR OTHER PREPROCEDURAL EXAMINATION: ICD-10-CM

## 2023-08-11 DIAGNOSIS — K21.9 GASTRO-ESOPHAGEAL REFLUX DISEASE WITHOUT ESOPHAGITIS: Chronic | ICD-10-CM

## 2023-08-11 DIAGNOSIS — Z96.651 PRESENCE OF RIGHT ARTIFICIAL KNEE JOINT: Chronic | ICD-10-CM

## 2023-08-11 DIAGNOSIS — Z98.61 CORONARY ANGIOPLASTY STATUS: Chronic | ICD-10-CM

## 2023-08-11 DIAGNOSIS — Z29.9 ENCOUNTER FOR PROPHYLACTIC MEASURES, UNSPECIFIED: ICD-10-CM

## 2023-08-11 LAB
A1C WITH ESTIMATED AVERAGE GLUCOSE RESULT: 5.1 % — SIGNIFICANT CHANGE UP (ref 4–5.6)
ABO RH CONFIRMATION: SIGNIFICANT CHANGE UP
ALBUMIN SERPL ELPH-MCNC: 4 G/DL — SIGNIFICANT CHANGE UP (ref 3.3–5)
ANION GAP SERPL CALC-SCNC: 1 MMOL/L — LOW (ref 5–17)
BASOPHILS # BLD AUTO: 0.1 K/UL — SIGNIFICANT CHANGE UP (ref 0–0.2)
BASOPHILS NFR BLD AUTO: 1.4 % — SIGNIFICANT CHANGE UP (ref 0–2)
BLD GP AB SCN SERPL QL: SIGNIFICANT CHANGE UP
BUN SERPL-MCNC: 23 MG/DL — SIGNIFICANT CHANGE UP (ref 7–23)
CALCIUM SERPL-MCNC: 9.1 MG/DL — SIGNIFICANT CHANGE UP (ref 8.5–10.1)
CHLORIDE SERPL-SCNC: 111 MMOL/L — HIGH (ref 96–108)
CO2 SERPL-SCNC: 29 MMOL/L — SIGNIFICANT CHANGE UP (ref 22–31)
CREAT SERPL-MCNC: 1.05 MG/DL — SIGNIFICANT CHANGE UP (ref 0.5–1.3)
EGFR: 76 ML/MIN/1.73M2 — SIGNIFICANT CHANGE UP
EOSINOPHIL # BLD AUTO: 0.11 K/UL — SIGNIFICANT CHANGE UP (ref 0–0.5)
EOSINOPHIL NFR BLD AUTO: 1.6 % — SIGNIFICANT CHANGE UP (ref 0–6)
ESTIMATED AVERAGE GLUCOSE: 100 MG/DL — SIGNIFICANT CHANGE UP (ref 68–114)
GLUCOSE SERPL-MCNC: 71 MG/DL — SIGNIFICANT CHANGE UP (ref 70–99)
HCT VFR BLD CALC: 46 % — SIGNIFICANT CHANGE UP (ref 39–50)
HGB BLD-MCNC: 16.3 G/DL — SIGNIFICANT CHANGE UP (ref 13–17)
IMM GRANULOCYTES NFR BLD AUTO: 0.3 % — SIGNIFICANT CHANGE UP (ref 0–0.9)
INR BLD: 1.05 RATIO — SIGNIFICANT CHANGE UP (ref 0.85–1.18)
LYMPHOCYTES # BLD AUTO: 1.84 K/UL — SIGNIFICANT CHANGE UP (ref 1–3.3)
LYMPHOCYTES # BLD AUTO: 26.4 % — SIGNIFICANT CHANGE UP (ref 13–44)
MCHC RBC-ENTMCNC: 31.7 PG — SIGNIFICANT CHANGE UP (ref 27–34)
MCHC RBC-ENTMCNC: 35.4 GM/DL — SIGNIFICANT CHANGE UP (ref 32–36)
MCV RBC AUTO: 89.5 FL — SIGNIFICANT CHANGE UP (ref 80–100)
MONOCYTES # BLD AUTO: 0.59 K/UL — SIGNIFICANT CHANGE UP (ref 0–0.9)
MONOCYTES NFR BLD AUTO: 8.5 % — SIGNIFICANT CHANGE UP (ref 2–14)
MRSA PCR RESULT.: DETECTED
NEUTROPHILS # BLD AUTO: 4.31 K/UL — SIGNIFICANT CHANGE UP (ref 1.8–7.4)
NEUTROPHILS NFR BLD AUTO: 61.8 % — SIGNIFICANT CHANGE UP (ref 43–77)
PLATELET # BLD AUTO: 195 K/UL — SIGNIFICANT CHANGE UP (ref 150–400)
POTASSIUM SERPL-MCNC: 4.6 MMOL/L — SIGNIFICANT CHANGE UP (ref 3.5–5.3)
POTASSIUM SERPL-SCNC: 4.6 MMOL/L — SIGNIFICANT CHANGE UP (ref 3.5–5.3)
PROTHROM AB SERPL-ACNC: 11.9 SEC — SIGNIFICANT CHANGE UP (ref 9.5–13)
RBC # BLD: 5.14 M/UL — SIGNIFICANT CHANGE UP (ref 4.2–5.8)
RBC # FLD: 12.9 % — SIGNIFICANT CHANGE UP (ref 10.3–14.5)
S AUREUS DNA NOSE QL NAA+PROBE: DETECTED
SODIUM SERPL-SCNC: 141 MMOL/L — SIGNIFICANT CHANGE UP (ref 135–145)
WBC # BLD: 6.97 K/UL — SIGNIFICANT CHANGE UP (ref 3.8–10.5)
WBC # FLD AUTO: 6.97 K/UL — SIGNIFICANT CHANGE UP (ref 3.8–10.5)

## 2023-08-11 PROCEDURE — 36415 COLL VENOUS BLD VENIPUNCTURE: CPT

## 2023-08-11 PROCEDURE — 93010 ELECTROCARDIOGRAM REPORT: CPT

## 2023-08-11 PROCEDURE — 82040 ASSAY OF SERUM ALBUMIN: CPT

## 2023-08-11 PROCEDURE — 87641 MR-STAPH DNA AMP PROBE: CPT

## 2023-08-11 PROCEDURE — 93005 ELECTROCARDIOGRAM TRACING: CPT

## 2023-08-11 PROCEDURE — 85610 PROTHROMBIN TIME: CPT

## 2023-08-11 PROCEDURE — 85025 COMPLETE CBC W/AUTO DIFF WBC: CPT

## 2023-08-11 PROCEDURE — 71046 X-RAY EXAM CHEST 2 VIEWS: CPT

## 2023-08-11 PROCEDURE — 99214 OFFICE O/P EST MOD 30 MIN: CPT | Mod: 25

## 2023-08-11 PROCEDURE — 83036 HEMOGLOBIN GLYCOSYLATED A1C: CPT

## 2023-08-11 PROCEDURE — 71046 X-RAY EXAM CHEST 2 VIEWS: CPT | Mod: 26

## 2023-08-11 PROCEDURE — 86901 BLOOD TYPING SEROLOGIC RH(D): CPT

## 2023-08-11 PROCEDURE — 80048 BASIC METABOLIC PNL TOTAL CA: CPT

## 2023-08-11 PROCEDURE — 87640 STAPH A DNA AMP PROBE: CPT

## 2023-08-11 PROCEDURE — 86900 BLOOD TYPING SEROLOGIC ABO: CPT

## 2023-08-11 PROCEDURE — 86850 RBC ANTIBODY SCREEN: CPT

## 2023-08-11 NOTE — H&P PST ADULT - NSICDXPASTMEDICALHX_GEN_ALL_CORE_FT
PAST MEDICAL HISTORY:  Amblyopia, right eye     Arthrosis of shoulder, left     Bilateral tinnitus     BPH (benign prostatic hyperplasia)     CAD (coronary artery disease)     Environmental and seasonal allergies     History of colon polyps     History of vertigo     HLD (hyperlipidemia)     [place] (hard of hearing)     HTN (hypertension)     Lumbar herniated disc     Melanoma     OA (osteoarthritis)     Obesity     KAUR on CPAP

## 2023-08-11 NOTE — H&P PST ADULT - HISTORY OF PRESENT ILLNESS
73 years old male with history of CAD(stent x 1 to LAD), HTN, HLD, Enlarged Prostate, [place] (bilateral hearing aids.  Post traumatic arthrosis of left shoulder. Patient had  injury to left shoulder in 1998. Several rotator cuff repair in the past. Reports constant aching pain and at times sharp pain to left shoulder. Decrease ROM. Planned reverse left shoulder replacement.

## 2023-08-11 NOTE — H&P PST ADULT - NSICDXPASTSURGICALHX_GEN_ALL_CORE_FT
PAST SURGICAL HISTORY:  Chronic GERD     H/O melanoma excision     H/O total knee replacement, right     History of colonoscopy with polypectomy     History of hemorrhoids     History of left knee replacement     History of PTCA 1     History of repair of left rotator cuff     History of repair of right rotator cuff     History of right knee surgery

## 2023-08-11 NOTE — H&P PST ADULT - ASSESSMENT
73 years old male present to PST prior to reverse left total shoulder replacement with Dr. Murguia.    Patient denies signs and symptoms of Covid 19 such as shortness of breath/ difficulty breathing, fever/chills, cough, mucle /body ach, headaches, loss of taste or smell.    Plan   1. Education and Instructions given to patient regarding upcoming surgery  2. NPO as per Dr. Murguia  3. Take the following medications with sips of water on the day of procedure: Olmesartan  4. Use E-Z sponge as directed  5. Use Mupirocin as directed.  6. Drink a quart of extra  fluids the day before your surgery.  7. Medical optimization for surgery with Dr. Sanders and cardiac evaluation with Dr. Miller  8. CBC, BMP, albumin, PT/ INR and PTT, Type and Screen, MRSA done in [facility] and sent to lab  9. EKG and Chest x- ray done in PST  10. Stop Aspirin 7 days prior to surgery if authorised by cardiologist.     CAPRINI SCORE [CLOT]    AGE RELATED RISK FACTORS                                                       MOBILITY RELATED FACTORS  [ ] Age 41-60 years                                            (1 Point)                  [ ] Bed rest                                                        (1 Point)  [x ] Age: 61-74 years                                           (2 Points)                 [ ] Plaster cast                                                   (2 Points)  [ ] Age= 75 years                                              (3 Points)                 [ ] Bed bound for more than 72 hours                 (2 Points)    DISEASE RELATED RISK FACTORS                                               GENDER SPECIFIC FACTORS  [ ] Edema in the lower extremities                       (1 Point)                  [ ] Pregnancy                                                     (1 Point)  [ ] Varicose veins                                               (1 Point)                  [ ] Post-partum < 6 weeks                                   (1 Point)             [ x] BMI > 25 Kg/m2                                            (1 Point)                  [ ] Hormonal therapy  or oral contraception          (1 Point)                 [ ] Sepsis (in the previous month)                        (1 Point)                  [ ] History of pregnancy complications                 (1 point)  [ ] Pneumonia or serious lung disease                                               [ ] Unexplained or recurrent                     (1 Point)           (in the previous month)                               (1 Point)  [ ] Abnormal pulmonary function test                     (1 Point)                 SURGERY RELATED RISK FACTORS  [ ] Acute myocardial infarction                              (1 Point)                 [ ]  Section                                             (1 Point)  [ ] Congestive heart failure (in the previous month)  (1 Point)               [ ] Minor surgery                                                  (1 Point)   [ ] Inflammatory bowel disease                             (1 Point)                 [ ] Arthroscopic surgery                                        (2 Points)  [ ] Central venous access                                      (2 Points)                [ ] General surgery lasting more than 45 minutes   (2 Points)       [ ] Stroke (in the previous month)                          (5 Points)               [x ] Elective arthroplasty                                         (5 Points)            [ ] malignancy present or previous                      (2 points)                                                                                                                                   HEMATOLOGY RELATED FACTORS                                                 TRAUMA RELATED RISK FACTORS  [ ] Prior episodes of VTE                                     (3 Points)                 [ ] Fracture of the hip, pelvis, or leg                       (5 Points)  [ ] Positive family history for VTE                         (3 Points)                 [ ] Acute spinal cord injury (in the previous month)  (5 Points)  [ ] Prothrombin 48243 A                                     (3 Points)                 [ ] Paralysis  (less than 1 month)                             (5 Points)  [ ] Factor V Leiden                                             (3 Points)                  [ ] Multiple Trauma within 1 month                        (5 Points)  [ ] Lupus anticoagulants                                     (3 Points)                                                           [ ] Anticardiolipin antibodies                               (3 Points)                                                       [ ] High homocysteine in the blood                      (3 Points)                                             [ ] Other congenital or acquired thrombophilia      (3 Points)                                                [ ] Heparin induced thrombocytopenia                  (3 Points)                                          Total Score [       8   ]

## 2023-08-12 DIAGNOSIS — Z01.818 ENCOUNTER FOR OTHER PREPROCEDURAL EXAMINATION: ICD-10-CM

## 2023-08-14 NOTE — CHART NOTE - NSCHARTNOTEFT_GEN_A_CORE
Patient positive for MRSA/MSSA from nasal swab done in [facility] on 8/11/23.   Patient instructed to apply Mupirocin to nostrils 2 times per day for 5 days starting 8/17/23 . Patient was able to verbalize instructions. Questions answered. Call made to surgeon regarding MRSA results and the need for Vancomycin IV on the day of surgery.

## 2023-08-16 ENCOUNTER — APPOINTMENT (OUTPATIENT)
Dept: ORTHOPEDIC SURGERY | Facility: CLINIC | Age: 73
End: 2023-08-16
Payer: OTHER MISCELLANEOUS

## 2023-08-16 VITALS — HEIGHT: 71 IN | BODY MASS INDEX: 32.2 KG/M2 | WEIGHT: 230 LBS

## 2023-08-16 PROCEDURE — 73030 X-RAY EXAM OF SHOULDER: CPT | Mod: LT

## 2023-08-16 PROCEDURE — 99213 OFFICE O/P EST LOW 20 MIN: CPT

## 2023-08-16 PROCEDURE — 73010 X-RAY EXAM OF SHOULDER BLADE: CPT | Mod: LT

## 2023-08-16 NOTE — HISTORY OF PRESENT ILLNESS
[Work related] : work related [9] : 9 [7] : 7 [Dull/Aching] : dull/aching [Intermittent] : intermittent [Household chores] : household chores [Leisure] : leisure [Nothing helps with pain getting better] : Nothing helps with pain getting better [] : no [FreeTextEntry1] : Lt. ldr [FreeTextEntry3] : 11/13/1998 [FreeTextEntry5] : 72 y/o M presents for [facility] eval. of Lt. Shldr. Pt reports pain levels remain the same. He is scheduled for Lt. rTSR next Tuesday.  [de-identified] : lifting  [de-identified] : 3/16/23 [de-identified] : Dr. Murguia

## 2023-08-16 NOTE — ASSESSMENT
[FreeTextEntry1] : Patient comes in today for follow-up on his left shoulder.  He is scheduled for his reverse left total shoulder replacement next week.  He continues have pain at rest.  He has pain reaching over his head and behind.  He continues to have weakness and difficulty lifting his arm past 90 degree position.  He is currently not working.  Examination of left upper extremity reveals normal neurovascular exam.  Examination of the left shoulder reveals no deformity.  He has active forward elevation to 40 degrees passive forward elevation of 160 is external rotation of 40 and internal rotation to L5.  He has 4/5 strength of teres minor and normal deltoid function.  No crepitation or instability.  X-rays done in the office today left shoulder 5 views show significant proximal migration to the undersurface of the acromion.  This moderate posttraumatic degenerative changes.  There is no obvious tumors masses or calcifications seen.  Plan is to proceed with a reverse left total shoulder replacement.  All risks benefits and alternatives of the procedure were discussed the patient detail and he wishes to proceed.  He has 100% temp primary total disability to the left shoulder.  His left shoulder pain is causally related to his work accident.  He is currently not working.

## 2023-08-21 RX ORDER — ACETAMINOPHEN 500 MG
1000 TABLET ORAL EVERY 8 HOURS
Refills: 0 | Status: DISCONTINUED | OUTPATIENT
Start: 2023-08-22 | End: 2023-08-23

## 2023-08-21 RX ORDER — SODIUM CHLORIDE 9 MG/ML
1000 INJECTION, SOLUTION INTRAVENOUS
Refills: 0 | Status: DISCONTINUED | OUTPATIENT
Start: 2023-08-22 | End: 2023-08-23

## 2023-08-21 RX ORDER — ONDANSETRON 8 MG/1
4 TABLET, FILM COATED ORAL EVERY 6 HOURS
Refills: 0 | Status: DISCONTINUED | OUTPATIENT
Start: 2023-08-22 | End: 2023-08-23

## 2023-08-21 RX ORDER — PROCHLORPERAZINE MALEATE 5 MG
5 TABLET ORAL EVERY 8 HOURS
Refills: 0 | Status: DISCONTINUED | OUTPATIENT
Start: 2023-08-22 | End: 2023-08-23

## 2023-08-21 RX ORDER — POLYETHYLENE GLYCOL 3350 17 G/17G
17 POWDER, FOR SOLUTION ORAL AT BEDTIME
Refills: 0 | Status: DISCONTINUED | OUTPATIENT
Start: 2023-08-22 | End: 2023-08-23

## 2023-08-21 RX ORDER — OXYCODONE HYDROCHLORIDE 5 MG/1
5 TABLET ORAL EVERY 4 HOURS
Refills: 0 | Status: DISCONTINUED | OUTPATIENT
Start: 2023-08-22 | End: 2023-08-23

## 2023-08-21 RX ORDER — OXYCODONE HYDROCHLORIDE 5 MG/1
10 TABLET ORAL EVERY 4 HOURS
Refills: 0 | Status: DISCONTINUED | OUTPATIENT
Start: 2023-08-22 | End: 2023-08-23

## 2023-08-21 RX ORDER — PANTOPRAZOLE SODIUM 20 MG/1
40 TABLET, DELAYED RELEASE ORAL DAILY
Refills: 0 | Status: DISCONTINUED | OUTPATIENT
Start: 2023-08-22 | End: 2023-08-23

## 2023-08-21 RX ORDER — ASPIRIN/CALCIUM CARB/MAGNESIUM 324 MG
325 TABLET ORAL DAILY
Refills: 0 | Status: DISCONTINUED | OUTPATIENT
Start: 2023-08-23 | End: 2023-08-23

## 2023-08-21 RX ORDER — SENNA PLUS 8.6 MG/1
2 TABLET ORAL AT BEDTIME
Refills: 0 | Status: DISCONTINUED | OUTPATIENT
Start: 2023-08-22 | End: 2023-08-23

## 2023-08-21 RX ORDER — CELECOXIB 200 MG/1
200 CAPSULE ORAL EVERY 12 HOURS
Refills: 0 | Status: DISCONTINUED | OUTPATIENT
Start: 2023-08-22 | End: 2023-08-23

## 2023-08-21 RX ORDER — HYDROMORPHONE HYDROCHLORIDE 2 MG/ML
0.5 INJECTION INTRAMUSCULAR; INTRAVENOUS; SUBCUTANEOUS EVERY 4 HOURS
Refills: 0 | Status: DISCONTINUED | OUTPATIENT
Start: 2023-08-22 | End: 2023-08-23

## 2023-08-22 ENCOUNTER — INPATIENT (INPATIENT)
Facility: HOSPITAL | Age: 73
LOS: 0 days | Discharge: HOME | DRG: 315 | End: 2023-08-23
Attending: ORTHOPAEDIC SURGERY | Admitting: ORTHOPAEDIC SURGERY
Payer: COMMERCIAL

## 2023-08-22 ENCOUNTER — TRANSCRIPTION ENCOUNTER (OUTPATIENT)
Age: 73
End: 2023-08-22

## 2023-08-22 ENCOUNTER — APPOINTMENT (OUTPATIENT)
Dept: ORTHOPEDIC SURGERY | Facility: HOSPITAL | Age: 73
End: 2023-08-22

## 2023-08-22 ENCOUNTER — RESULT REVIEW (OUTPATIENT)
Age: 73
End: 2023-08-22

## 2023-08-22 VITALS
DIASTOLIC BLOOD PRESSURE: 76 MMHG | RESPIRATION RATE: 18 BRPM | WEIGHT: 229.94 LBS | HEART RATE: 59 BPM | TEMPERATURE: 97 F | HEIGHT: 69 IN | SYSTOLIC BLOOD PRESSURE: 139 MMHG | OXYGEN SATURATION: 100 %

## 2023-08-22 DIAGNOSIS — Z98.890 OTHER SPECIFIED POSTPROCEDURAL STATES: Chronic | ICD-10-CM

## 2023-08-22 DIAGNOSIS — Z96.651 PRESENCE OF RIGHT ARTIFICIAL KNEE JOINT: Chronic | ICD-10-CM

## 2023-08-22 DIAGNOSIS — Z87.19 PERSONAL HISTORY OF OTHER DISEASES OF THE DIGESTIVE SYSTEM: Chronic | ICD-10-CM

## 2023-08-22 DIAGNOSIS — Z98.61 CORONARY ANGIOPLASTY STATUS: Chronic | ICD-10-CM

## 2023-08-22 DIAGNOSIS — Z96.652 PRESENCE OF LEFT ARTIFICIAL KNEE JOINT: Chronic | ICD-10-CM

## 2023-08-22 DIAGNOSIS — K21.9 GASTRO-ESOPHAGEAL REFLUX DISEASE WITHOUT ESOPHAGITIS: Chronic | ICD-10-CM

## 2023-08-22 DIAGNOSIS — M19.112 POST-TRAUMATIC OSTEOARTHRITIS, LEFT SHOULDER: ICD-10-CM

## 2023-08-22 LAB
ANION GAP SERPL CALC-SCNC: 4 MMOL/L — LOW (ref 5–17)
BUN SERPL-MCNC: 18 MG/DL — SIGNIFICANT CHANGE UP (ref 7–23)
CALCIUM SERPL-MCNC: 8.7 MG/DL — SIGNIFICANT CHANGE UP (ref 8.5–10.1)
CHLORIDE SERPL-SCNC: 114 MMOL/L — HIGH (ref 96–108)
CO2 SERPL-SCNC: 25 MMOL/L — SIGNIFICANT CHANGE UP (ref 22–31)
CREAT SERPL-MCNC: 1.03 MG/DL — SIGNIFICANT CHANGE UP (ref 0.5–1.3)
EGFR: 78 ML/MIN/1.73M2 — SIGNIFICANT CHANGE UP
GLUCOSE BLDC GLUCOMTR-MCNC: 66 MG/DL — LOW (ref 70–99)
GLUCOSE BLDC GLUCOMTR-MCNC: 85 MG/DL — SIGNIFICANT CHANGE UP (ref 70–99)
GLUCOSE SERPL-MCNC: 78 MG/DL — SIGNIFICANT CHANGE UP (ref 70–99)
HCT VFR BLD CALC: 40.8 % — SIGNIFICANT CHANGE UP (ref 39–50)
HGB BLD-MCNC: 14.7 G/DL — SIGNIFICANT CHANGE UP (ref 13–17)
MCHC RBC-ENTMCNC: 32.7 PG — SIGNIFICANT CHANGE UP (ref 27–34)
MCHC RBC-ENTMCNC: 36 GM/DL — SIGNIFICANT CHANGE UP (ref 32–36)
MCV RBC AUTO: 90.9 FL — SIGNIFICANT CHANGE UP (ref 80–100)
PLATELET # BLD AUTO: 148 K/UL — LOW (ref 150–400)
POTASSIUM SERPL-MCNC: 3.9 MMOL/L — SIGNIFICANT CHANGE UP (ref 3.5–5.3)
POTASSIUM SERPL-SCNC: 3.9 MMOL/L — SIGNIFICANT CHANGE UP (ref 3.5–5.3)
RBC # BLD: 4.49 M/UL — SIGNIFICANT CHANGE UP (ref 4.2–5.8)
RBC # FLD: 12.8 % — SIGNIFICANT CHANGE UP (ref 10.3–14.5)
SODIUM SERPL-SCNC: 143 MMOL/L — SIGNIFICANT CHANGE UP (ref 135–145)
WBC # BLD: 10 K/UL — SIGNIFICANT CHANGE UP (ref 3.8–10.5)
WBC # FLD AUTO: 10 K/UL — SIGNIFICANT CHANGE UP (ref 3.8–10.5)

## 2023-08-22 PROCEDURE — C1713: CPT

## 2023-08-22 PROCEDURE — 73030 X-RAY EXAM OF SHOULDER: CPT | Mod: LT

## 2023-08-22 PROCEDURE — 97166 OT EVAL MOD COMPLEX 45 MIN: CPT | Mod: GO

## 2023-08-22 PROCEDURE — 82962 GLUCOSE BLOOD TEST: CPT

## 2023-08-22 PROCEDURE — 97535 SELF CARE MNGMENT TRAINING: CPT | Mod: GO

## 2023-08-22 PROCEDURE — 73030 X-RAY EXAM OF SHOULDER: CPT | Mod: 26,LT

## 2023-08-22 PROCEDURE — 23472 RECONSTRUCT SHOULDER JOINT: CPT | Mod: LT

## 2023-08-22 PROCEDURE — C1776: CPT

## 2023-08-22 PROCEDURE — 88305 TISSUE EXAM BY PATHOLOGIST: CPT | Mod: 26

## 2023-08-22 PROCEDURE — 80048 BASIC METABOLIC PNL TOTAL CA: CPT

## 2023-08-22 PROCEDURE — 36415 COLL VENOUS BLD VENIPUNCTURE: CPT

## 2023-08-22 PROCEDURE — 85027 COMPLETE CBC AUTOMATED: CPT

## 2023-08-22 PROCEDURE — 88305 TISSUE EXAM BY PATHOLOGIST: CPT

## 2023-08-22 RX ORDER — VANCOMYCIN HCL 1 G
1500 VIAL (EA) INTRAVENOUS ONCE
Refills: 0 | Status: COMPLETED | OUTPATIENT
Start: 2023-08-22 | End: 2023-08-22

## 2023-08-22 RX ORDER — PANTOPRAZOLE SODIUM 20 MG/1
40 TABLET, DELAYED RELEASE ORAL ONCE
Refills: 0 | Status: COMPLETED | OUTPATIENT
Start: 2023-08-22 | End: 2023-08-22

## 2023-08-22 RX ORDER — OXYCODONE HYDROCHLORIDE 5 MG/1
1 TABLET ORAL
Qty: 30 | Refills: 0
Start: 2023-08-22 | End: 2023-08-26

## 2023-08-22 RX ORDER — APREPITANT 80 MG/1
40 CAPSULE ORAL ONCE
Refills: 0 | Status: COMPLETED | OUTPATIENT
Start: 2023-08-22 | End: 2023-08-22

## 2023-08-22 RX ORDER — ONDANSETRON 8 MG/1
4 TABLET, FILM COATED ORAL ONCE
Refills: 0 | Status: DISCONTINUED | OUTPATIENT
Start: 2023-08-22 | End: 2023-08-22

## 2023-08-22 RX ORDER — OLMESARTAN MEDOXOMIL 5 MG/1
1 TABLET, FILM COATED ORAL
Refills: 0 | DISCHARGE

## 2023-08-22 RX ORDER — CHOLECALCIFEROL (VITAMIN D3) 125 MCG
1 CAPSULE ORAL
Refills: 0 | DISCHARGE

## 2023-08-22 RX ORDER — LOSARTAN POTASSIUM 100 MG/1
50 TABLET, FILM COATED ORAL DAILY
Refills: 0 | Status: DISCONTINUED | OUTPATIENT
Start: 2023-08-22 | End: 2023-08-23

## 2023-08-22 RX ORDER — PANTOPRAZOLE SODIUM 20 MG/1
1 TABLET, DELAYED RELEASE ORAL
Qty: 30 | Refills: 0
Start: 2023-08-22 | End: 2023-09-20

## 2023-08-22 RX ORDER — ASPIRIN/CALCIUM CARB/MAGNESIUM 324 MG
1 TABLET ORAL
Qty: 0 | Refills: 0 | DISCHARGE

## 2023-08-22 RX ORDER — ASPIRIN/CALCIUM CARB/MAGNESIUM 324 MG
1 TABLET ORAL
Qty: 14 | Refills: 0
Start: 2023-08-22 | End: 2023-09-04

## 2023-08-22 RX ORDER — ASCORBIC ACID 60 MG
1 TABLET,CHEWABLE ORAL
Refills: 0 | DISCHARGE

## 2023-08-22 RX ORDER — OXYCODONE HYDROCHLORIDE 5 MG/1
5 TABLET ORAL ONCE
Refills: 0 | Status: DISCONTINUED | OUTPATIENT
Start: 2023-08-22 | End: 2023-08-22

## 2023-08-22 RX ORDER — SENNA PLUS 8.6 MG/1
2 TABLET ORAL
Qty: 28 | Refills: 0
Start: 2023-08-22 | End: 2023-09-04

## 2023-08-22 RX ORDER — FENTANYL CITRATE 50 UG/ML
50 INJECTION INTRAVENOUS
Refills: 0 | Status: DISCONTINUED | OUTPATIENT
Start: 2023-08-22 | End: 2023-08-22

## 2023-08-22 RX ORDER — SODIUM CHLORIDE 9 MG/ML
1000 INJECTION, SOLUTION INTRAVENOUS
Refills: 0 | Status: DISCONTINUED | OUTPATIENT
Start: 2023-08-22 | End: 2023-08-22

## 2023-08-22 RX ORDER — POLYETHYLENE GLYCOL 3350 17 G/17G
17 POWDER, FOR SOLUTION ORAL
Qty: 1 | Refills: 0
Start: 2023-08-22 | End: 2023-09-04

## 2023-08-22 RX ORDER — EVOLOCUMAB 140 MG/ML
140 INJECTION, SOLUTION SUBCUTANEOUS
Refills: 0 | DISCHARGE

## 2023-08-22 RX ORDER — ACETAMINOPHEN 500 MG
2 TABLET ORAL
Qty: 84 | Refills: 0
Start: 2023-08-22 | End: 2023-09-04

## 2023-08-22 RX ORDER — DEXAMETHASONE 0.5 MG/5ML
8 ELIXIR ORAL ONCE
Refills: 0 | Status: COMPLETED | OUTPATIENT
Start: 2023-08-23 | End: 2023-08-23

## 2023-08-22 RX ADMIN — APREPITANT 40 MILLIGRAM(S): 80 CAPSULE ORAL at 16:05

## 2023-08-22 RX ADMIN — Medication 1000 MILLIGRAM(S): at 22:31

## 2023-08-22 RX ADMIN — PANTOPRAZOLE SODIUM 40 MILLIGRAM(S): 20 TABLET, DELAYED RELEASE ORAL at 16:02

## 2023-08-22 RX ADMIN — Medication 250 MILLIGRAM(S): at 23:40

## 2023-08-22 RX ADMIN — CELECOXIB 200 MILLIGRAM(S): 200 CAPSULE ORAL at 22:31

## 2023-08-22 RX ADMIN — SODIUM CHLORIDE 75 MILLILITER(S): 9 INJECTION, SOLUTION INTRAVENOUS at 22:31

## 2023-08-22 RX ADMIN — OXYCODONE HYDROCHLORIDE 5 MILLIGRAM(S): 5 TABLET ORAL at 20:09

## 2023-08-22 RX ADMIN — SENNA PLUS 2 TABLET(S): 8.6 TABLET ORAL at 22:31

## 2023-08-22 RX ADMIN — OXYCODONE HYDROCHLORIDE 5 MILLIGRAM(S): 5 TABLET ORAL at 21:00

## 2023-08-22 NOTE — DISCHARGE NOTE PROVIDER - NSDCMRMEDTOKEN_GEN_ALL_CORE_FT
ascorbic acid 500 mg oral capsule: 1 cap(s) orally once a day  aspirin 81 mg oral tablet: 1 tab(s) orally once a day  Multiple Vitamins oral tablet: 1 tab(s) orally once a day  olmesartan 20 mg oral tablet: 1 tab(s) orally once a day  pantoprazole 40 mg oral delayed release tablet: 1 tab(s) orally once a day  Repatha 140 mg/mL subcutaneous solution: 140 milligram(s) subcutaneously 2 times a month  Vitamin D3 125 mcg (5000 intl units) oral tablet: 1 tab(s) orally once a day   ascorbic acid 500 mg oral capsule: 1 cap(s) orally once a day  aspirin 81 mg oral tablet: 1 tab(s) orally once a day STOP 81mg daily while taking 325mg daily course; RESUME 81mg daily when 325mg daily course completed  Aspirin Enteric Coated 325 mg oral delayed release tablet: 1 tab(s) orally once a day  MiraLax oral powder for reconstitution: 17 gram(s) orally once a day as needed for  constipation  Multiple Vitamins oral tablet: 1 tab(s) orally once a day  olmesartan 20 mg oral tablet: 1 tab(s) orally once a day  oxyCODONE 5 mg oral tablet: 1 tab(s) orally every 4 hours as needed for  severe pain MDD: 6  Protonix 40 mg oral delayed release tablet: 1 tab(s) orally once a day  Repatha 140 mg/mL subcutaneous solution: 140 milligram(s) subcutaneously 2 times a month  Senna 8.6 mg oral tablet: 2 tab(s) orally once a day (at bedtime) as needed for  constipation  Tylenol Extra Strength 500 mg oral tablet: 2 tab(s) orally every 8 hours  Vitamin D3 125 mcg (5000 intl units) oral tablet: 1 tab(s) orally once a day

## 2023-08-22 NOTE — BRIEF OPERATIVE NOTE - NSICDXBRIEFPREOP_GEN_ALL_CORE_FT
PRE-OP DIAGNOSIS:  Arthritis of shoulder region, left 22-Aug-2023 17:58:12  Maryuri Calero  Arthritis of left shoulder region 22-Aug-2023 17:58:28  Maryuri Calero

## 2023-08-22 NOTE — DISCHARGE NOTE PROVIDER - NSDCFUADDINST_GEN_ALL_CORE_FT
Discharge Instructions for Left Reverse Total Shoulder Arthroplasty:     1. Activity: Non-Weight Bearing Left Upper Extremity with shoulder immobilizer. No Aggressive ROM until cleared by Dr. Murguia. Maintain sling at all times EXCEPT to straighten elbow a few times daily. Elevate hand and wiggle fingers. Do not start any outpatient PT until you see Dr. Murguia in the office.    2. Call with fever over 101, wound redness, drainage.    3. Wound care: Do not remove or change dressing unless saturated.  IF so, apply dry gauze, tegaderm. Be careful not to removed mesh that is glued to the wound. There are no sutures or staples to remove.    4.Okay to shower, however sponge baths are recommended. Do not submerge dressing or let shower stream beat on dressing. Please avoid baths/pools/hot tubs until cleared by your surgeon.     5. Continue to apply ICE packs.    6. DVT PE Prophylaxis: See med Rec.    7. Follow Up: Orthopedics, Dr. MURGUIA  10-14 days in office. Call to schedule. If going home, eRx sent to your pharmacy for .

## 2023-08-22 NOTE — DISCHARGE NOTE PROVIDER - NSDCCPCAREPLAN_GEN_ALL_CORE_FT
PRINCIPAL DISCHARGE DIAGNOSIS  Diagnosis: Primary osteoarthritis of left shoulder  Assessment and Plan of Treatment:

## 2023-08-22 NOTE — PATIENT PROFILE ADULT - FALL HARM RISK - UNIVERSAL INTERVENTIONS
Bed in lowest position, wheels locked, appropriate side rails in place/Call bell, personal items and telephone in reach/Instruct patient to call for assistance before getting out of bed or chair/Non-slip footwear when patient is out of bed/[place] to call system/Physically safe environment - no spills, clutter or unnecessary equipment/Purposeful Proactive Rounding/Room/bathroom lighting operational, light cord in reach

## 2023-08-22 NOTE — DISCHARGE NOTE PROVIDER - NSDCFUSCHEDAPPT_GEN_ALL_CORE_FT
[facility]  [address] [facility]  Scheduled Appointment: 08/30/2023

## 2023-08-22 NOTE — PROGRESS NOTE ADULT - ASSESSMENT
A: 73M s/p reverse TSA    P;  NWB DARREN   PT   sling   pain control   follow labs   DVT ppx   postop abx   venodynes  incentive spirometer  recheck sensory/motor function

## 2023-08-22 NOTE — PATIENT PROFILE ADULT - FUNCTIONAL ASSESSMENT - BASIC MOBILITY ASSESSMENT TYPE
[facility] Dermatology Note  Encounter Date: Mar 31, 2021  Store-and-Forward and Telephone ([phone] ). Location of teledermatologist: [facility] [place].  Start time: 1:02 PM. End time: 1:23 PM.    Dermatology Problem List:  1. Atopic dermatitis: Desonide lotion to breast, TAC ointment on trunk/extremities  2. Irritant contact hand dermatitis: Augmented betamethasone, protopic 0.1% ointment  3. Post-inflammatory hyperpigmetation  ____________________________________________    Assessment & Plan:     # Chronic hand dermatitis. Ddx irritant/ACD versus dyshidrotic eczema. Etiology and chronic nature discussed. Discussed additional more aggressive therapies such as nbUVB, dupixent as well as further diagnostic work-up, including patch testing to r/o ACD. We also discussed alternative topical therapies including topical calcineurin inhibitors which may be safer for long-term use. Patient opted for trial of calcineurin inhibitor. Will research other potential options and contact us if she would like to pursue any of these.   - Start protopic 0.1% ointment BID  - Can use augmented betamethasone 0.05% ointment BID PRN flares  - Consider nbUVB phototherapy, dupixent, versus referral for patch testing if flaring; patient to contact clinic if she would like discuss these again    Procedures Performed:    None    Follow-up: prn for new or changing lesions    Staff:     Shmuel Huang MD  Pronouns: he/him/his    Department of Dermatology  [facility]: Phone: [phone], Fax:[phone]  [facility]: Phone: [phone] Fax: [phone]    ____________________________________________    CC: Derm Problem (Hakeem has a telephone visit to discuss dermatitis. )    HPI:  Ms. Hakeem Meade is a(n) 34 year old female who presents today as a return patient for  "hand dermatitis and nipple dermatitis / atopic dermatitis. Last seen on 12/29/2020 when started on desonide 0.05% ointment BID PRN for the nipple area, triamcinolone 0.1% ointment for the trunk/extremities, and augmented betamethasone 0.05% ointment for the hands.     Today, she reports the rash on her nipple area/trunk has resolved. She unfortunately continues to have intermittent rash on the hands. She states this is improved from prior, but will have an outbreak every 1-2 weeks. This typically responds to betamethasone 0.05% ointment. Patient does report numerous potential triggers. [place], she did have a new bab in the last year and does report numerous new topical exposures. She otherwise is using a gentle soap and CeraVe lotion. She would be interested in discussing options to \"cure\" things.     Patient is otherwise feeling well, without additional skin concerns.    Labs Reviewed:  N/A    Physical Exam:  Vitals: There were no vitals taken for this visit.  SKIN: Teledermatology photos were reviewed; image quality and interpretability: acceptable. Image date: 3/31/21.  - Pink, eczematous scaly papules with mild desquamative scale on fingers and dorsal hands.   - No other lesions of concern on areas examined.              Medications:  Current Outpatient Medications   Medication     docusate sodium (COLACE) 100 MG capsule     LECITHIN PO     Prenatal Vit-Fe Fumarate-FA (PRENATAL MULTIVITAMIN PLUS IRON) 27-0.8 MG TABS per tablet     triamcinolone (KENALOG) 0.1 % external cream     augmented betamethasone dipropionate (DIPROLENE-AF) 0.05 % external ointment     desonide (DESOWEN) 0.05 % external lotion     No current facility-administered medications for this visit.      Facility-Administered Medications Ordered in Other Visits   Medication     lidocaine-EPINEPHrine 1.5 %-1:[id number] injection      Past Medical/Surgical History:   Patient Active Problem List "   Diagnosis     history of cystic hygroma     History of termination of pregnancy -  Increased risk of Monosomy X (Christine Syndrome) based on cell-free DNA screen      Family history of heart block      (spontaneous vaginal delivery)     Past Medical History:   Diagnosis Date     NO ACTIVE PROBLEMS       Admission

## 2023-08-22 NOTE — DISCHARGE NOTE PROVIDER - HOSPITAL COURSE
H&P:  Pt is a 73y Male    Multiple PMHx    Now s/p Left Reverse Total Shoulder Arthroplasty. Pt is afebrile with stable vital signs. Pain is controlled. Alert and Oriented. Exam reveals intact AIN/PIN, wrist flexion and wrist extension, 2+Radial Pulse. Dressing is clean and dry with telfa/tegaderm on.    Hospital Course:  Patient presented to [facility] medically cleared for Left Reverse Total Shoulder Replacement Surgery, having failed outpatient non-operative conservative management. Prophylactic antibiotics were started before the procedure and continued for 24 hours. They were admitted after surgery to the orthopedic floor.   There were no complications during the hospital stay.     Routine consult was obtained from Physical Therapy for twice daily PT ROM limited to full forward elevation, Internal rotation to Chest, external rotation to neutral, no extension. Consult to Hospitalist for Medical Co-management. Patient was placed on anticoagulation post-op.  Pertinent home medications were continued.  Daily labs were followed.      On POD 0 there were no overnight events and the pt was OOB with PT. POD 1, PT was continued, and anticipate POD 1 DC to home. The orthopedic Attending is aware and agrees. H&P:  Pt is a 73y Male    Multiple PMHx    Now s/p Left Reverse Total Shoulder Arthroplasty. Pt is afebrile with stable vital signs. Pain is controlled. Alert and Oriented. Exam reveals intact AIN/PIN, wrist flexion and wrist extension, 2+Radial Pulse. Dressing is clean and dry with telfa/tegaderm on.    Hospital Course:  Patient presented to [facility] medically cleared for Left Reverse Total Shoulder Replacement Surgery, having failed outpatient non-operative conservative management. Prophylactic antibiotics were started before the procedure and continued for 24 hours. They were admitted after surgery to the orthopedic floor. There were no complications during the hospital stay.     Routine consult was obtained from Physical Therapy for twice daily PT ROM limited to full forward elevation, Internal rotation to Chest, external rotation to neutral, no extension. Consult to Hospitalist for Medical Co-management. Patient was placed on anticoagulation post-op.  Pertinent home medications were continued.  Daily labs were followed.      On POD 0 there were no overnight events and the pt was OOB with PT. POD 1, PT was continued, and anticipate POD 1 DC to home. The orthopedic Attending is aware and agrees.

## 2023-08-22 NOTE — PROGRESS NOTE ADULT - SUBJECTIVE AND OBJECTIVE BOX
pt seen at bedside in pACU resting in NAD, comfortable, minimal pain controlled with medication, still with some residual numbness secondary to anesthesia block. no other complaints                          14.7   10.00 )-----------( 148      ( 22 Aug 2023 18:49 )             40.8       PE left shoulder  dressing c/d/i   sling intact  decreased sensation axillary patch  unable to extend wrist likely secondary to anesthesia block   cap refill brisk   SILT   radial pulse 2+    pt seen at bedside in pACU resting in NAD, comfortable, minimal pain controlled with medication, still with some residual numbness secondary to anesthesia block. no other complaints                          14.7   10.00 )-----------( 148      ( 22 Aug 2023 18:49 )             40.8       PE left shoulder  dressing c/d/i   compartments soft non tender   sling intact  decreased sensation axillary patch  unable to extend wrist likely secondary to anesthesia block   cap refill brisk   SILT   radial pulse 2+

## 2023-08-23 ENCOUNTER — TRANSCRIPTION ENCOUNTER (OUTPATIENT)
Age: 73
End: 2023-08-23

## 2023-08-23 VITALS
OXYGEN SATURATION: 96 % | RESPIRATION RATE: 18 BRPM | HEART RATE: 69 BPM | SYSTOLIC BLOOD PRESSURE: 123 MMHG | TEMPERATURE: 98 F | DIASTOLIC BLOOD PRESSURE: 61 MMHG

## 2023-08-23 LAB
ANION GAP SERPL CALC-SCNC: 5 MMOL/L — SIGNIFICANT CHANGE UP (ref 5–17)
BUN SERPL-MCNC: 23 MG/DL — SIGNIFICANT CHANGE UP (ref 7–23)
CALCIUM SERPL-MCNC: 8.6 MG/DL — SIGNIFICANT CHANGE UP (ref 8.5–10.1)
CHLORIDE SERPL-SCNC: 108 MMOL/L — SIGNIFICANT CHANGE UP (ref 96–108)
CO2 SERPL-SCNC: 27 MMOL/L — SIGNIFICANT CHANGE UP (ref 22–31)
CREAT SERPL-MCNC: 1.01 MG/DL — SIGNIFICANT CHANGE UP (ref 0.5–1.3)
EGFR: 80 ML/MIN/1.73M2 — SIGNIFICANT CHANGE UP
GLUCOSE SERPL-MCNC: 182 MG/DL — HIGH (ref 70–99)
HCT VFR BLD CALC: 37.5 % — LOW (ref 39–50)
HGB BLD-MCNC: 13.7 G/DL — SIGNIFICANT CHANGE UP (ref 13–17)
MCHC RBC-ENTMCNC: 32.7 PG — SIGNIFICANT CHANGE UP (ref 27–34)
MCHC RBC-ENTMCNC: 36.5 GM/DL — HIGH (ref 32–36)
MCV RBC AUTO: 89.5 FL — SIGNIFICANT CHANGE UP (ref 80–100)
PLATELET # BLD AUTO: 147 K/UL — LOW (ref 150–400)
POTASSIUM SERPL-MCNC: 4.4 MMOL/L — SIGNIFICANT CHANGE UP (ref 3.5–5.3)
POTASSIUM SERPL-SCNC: 4.4 MMOL/L — SIGNIFICANT CHANGE UP (ref 3.5–5.3)
RBC # BLD: 4.19 M/UL — LOW (ref 4.2–5.8)
RBC # FLD: 12.9 % — SIGNIFICANT CHANGE UP (ref 10.3–14.5)
SODIUM SERPL-SCNC: 140 MMOL/L — SIGNIFICANT CHANGE UP (ref 135–145)
WBC # BLD: 13.31 K/UL — HIGH (ref 3.8–10.5)
WBC # FLD AUTO: 13.31 K/UL — HIGH (ref 3.8–10.5)

## 2023-08-23 PROCEDURE — 99253 IP/OBS CNSLTJ NEW/EST LOW 45: CPT

## 2023-08-23 RX ORDER — CEFAZOLIN SODIUM 1 G
2000 VIAL (EA) INJECTION EVERY 8 HOURS
Refills: 0 | Status: COMPLETED | OUTPATIENT
Start: 2023-08-23 | End: 2023-08-23

## 2023-08-23 RX ORDER — PANTOPRAZOLE SODIUM 20 MG/1
1 TABLET, DELAYED RELEASE ORAL
Refills: 0 | DISCHARGE

## 2023-08-23 RX ADMIN — CELECOXIB 200 MILLIGRAM(S): 200 CAPSULE ORAL at 09:28

## 2023-08-23 RX ADMIN — PANTOPRAZOLE SODIUM 40 MILLIGRAM(S): 20 TABLET, DELAYED RELEASE ORAL at 09:28

## 2023-08-23 RX ADMIN — Medication 1000 MILLIGRAM(S): at 13:59

## 2023-08-23 RX ADMIN — LOSARTAN POTASSIUM 50 MILLIGRAM(S): 100 TABLET, FILM COATED ORAL at 09:28

## 2023-08-23 RX ADMIN — Medication 100 MILLIGRAM(S): at 08:02

## 2023-08-23 RX ADMIN — Medication 325 MILLIGRAM(S): at 09:28

## 2023-08-23 RX ADMIN — Medication 101.6 MILLIGRAM(S): at 05:27

## 2023-08-23 RX ADMIN — Medication 1000 MILLIGRAM(S): at 05:26

## 2023-08-23 RX ADMIN — Medication 100 MILLIGRAM(S): at 14:03

## 2023-08-23 NOTE — PROGRESS NOTE ADULT - SUBJECTIVE AND OBJECTIVE BOX
Patient seen and examined at bedside. No acute complaints at this time. Pain well controlled. Denies chest pain, shortness of breath, nausea or vomiting. Overnight patient started vanco and RUE IV site became indurated, warm erythematous per RN. RN flushed IV to confirm not infiltrated, urticaria formation, IVF stopped and cold packs placed. Reduced sensation over L thumb and lateral forearm    PE:  Vital Signs Last 24 Hrs  T(C): 36.4 (08-23-23 @ 04:10), Max: 36.4 (08-23-23 @ 00:02)  T(F): 97.5 (08-23-23 @ 04:10), Max: 97.5 (08-23-23 @ 00:02)  HR: 67 (08-23-23 @ 04:10) (59 - 87)  BP: 115/53 (08-23-23 @ 04:10) (115/53 - 157/74)  BP(mean): --  RR: 16 (08-23-23 @ 04:10) (11 - 29)  SpO2: 98% (08-23-23 @ 04:10) (97% - 100%)    General: NAD, resting comfortably in bed  LUE:   Dressing C/D/I  Compartments soft and compressible  No calf tenderness bilaterally  +Axillary/Musculocutaneous/Radial/Median/AIN/PIN intact  reduced sensation over L thumb and lateral forearm otherwise SILT   2+ radial pulses            A/P:  73y m s/p L rTSA POD 1  -PT/OT   -NWB  - No ROM  -Pain Control  -DVT ppx   -Continue perioperative abx x 24 hours, follow up restarting abx this AM   -FU AM Labs  -Rest, ice, compress and elevate the extremity as we needed  -Incentive Spirometry  - Dispo pending PT recs  Patient seen and examined at bedside. No acute complaints at this time. Pain well controlled. Denies chest pain, shortness of breath, nausea or vomiting. Overnight patient started vanco and RUE IV site became indurated, warm erythematous per RN. RN flushed IV to confirm not infiltrated, urticaria formation, IVF stopped and cold packs placed. Reduced sensation over L thumb and lateral forearm    PE:  Vital Signs Last 24 Hrs  T(C): 36.4 (08-23-23 @ 04:10), Max: 36.4 (08-23-23 @ 00:02)  T(F): 97.5 (08-23-23 @ 04:10), Max: 97.5 (08-23-23 @ 00:02)  HR: 67 (08-23-23 @ 04:10) (59 - 87)  BP: 115/53 (08-23-23 @ 04:10) (115/53 - 157/74)  BP(mean): --  RR: 16 (08-23-23 @ 04:10) (11 - 29)  SpO2: 98% (08-23-23 @ 04:10) (97% - 100%)    General: NAD, resting comfortably in bed  LUE:   Dressing C/D/I  Compartments soft and compressible  No calf tenderness bilaterally  +Axillary/Musculocutaneous/Radial/Median/AIN/PIN intact  reduced sensation over L thumb and lateral forearm otherwise SILT   2+ radial pulses            A/P:  73y m s/p L rTSA POD 1  -PT/OT   -NWB  - No ROM  -Pain Control  -DVT ppx   -Continue perioperative abx x 24 hours  - Discussed with Dr. Murguia overnight cutaneous reaction to vanco, Dr. Murguia recommended starting ancef 2g x2 doses this morning   -FU AM Labs  -Rest, ice, compress and elevate the extremity as we needed  -Incentive Spirometry  - Dispo pending PT recs

## 2023-08-23 NOTE — DIETITIAN INITIAL EVALUATION ADULT - NSFNSGIIOFT_GEN_A_CORE
I&O's Detail    22 Aug 2023 07:01  -  23 Aug 2023 07:00  --------------------------------------------------------  IN:    IV PiggyBack: 50 mL    Lactated Ringers: 375 mL    Lactated Ringers: 600 mL    Oral Fluid: 240 mL    Other (mL): 1000 mL  Total IN: 2265 mL    OUT:    Voided (mL): 350 mL  Total OUT: 350 mL    Total NET: 1915 mL      23 Aug 2023 07:01  -  23 Aug 2023 10:45  --------------------------------------------------------  IN:    IV PiggyBack: 50 mL  Total IN: 50 mL    OUT:  Total OUT: 0 mL    Total NET: 50 mL

## 2023-08-23 NOTE — OCCUPATIONAL THERAPY INITIAL EVALUATION ADULT - MODALITIES TREATMENT COMMENTS
Pt left seated in bedside chair, RAE Glass notified, CB/TT/phone IR, lines intact, needs met, instructed not to get up on own and use CB.

## 2023-08-23 NOTE — DIETITIAN INITIAL EVALUATION ADULT - PERTINENT LABORATORY DATA
08-23    140  |  108  |  23  ----------------------------<  182<H>  4.4   |  27  |  1.01    Ca    8.6      23 Aug 2023 08:41    POCT Blood Glucose.: 66 mg/dL (08-22-23 @ 18:19)  A1C with Estimated Average Glucose Result: 5.1 % (08-11-23 @ 11:50)

## 2023-08-23 NOTE — OCCUPATIONAL THERAPY INITIAL EVALUATION ADULT - LEVEL OF INDEPENDENCE: DRESS UPPER BODY, OT EVAL
educated on one handed dressing technique, clothing recommendations, how to don/doff sling/tops/supervision

## 2023-08-23 NOTE — DISCHARGE NOTE NURSING/CASE MANAGEMENT/SOCIAL WORK - PATIENT PORTAL LINK FT
You can access the FollowMyHealth Patient Portal offered by [facility] by registering at the following website: [email or web address]. By joining [employer]’s FollowMyHealth portal, you will also be able to view your health information using other applications (apps) compatible with our system.

## 2023-08-23 NOTE — CONSULT NOTE ADULT - SUBJECTIVE AND OBJECTIVE BOX
PCP    Patient is a 73y old  Male who presents with a chief complaint of L reverse total Shoulder arthroplasty (23 Aug 2023 05:29)        HPI:      Review of system- All 10 systems reviewed and is as per HPI otherwise negative.           T(C): 36.3 (08-23-23 @ 07:30), Max: 36.4 (08-23-23 @ 00:02)  HR: 65 (08-23-23 @ 07:30) (59 - 87)  BP: 126/62 (08-23-23 @ 07:30) (115/53 - 157/74)  RR: 18 (08-23-23 @ 07:30) (11 - 29)  SpO2: 97% (08-23-23 @ 07:30) (97% - 100%)  Wt(kg): --      LABS:                        14.7   10.00 )-----------( 148      ( 22 Aug 2023 18:49 )             40.8     08-22    143  |  114<H>  |  18  ----------------------------<  78  3.9   |  25  |  1.03    Ca    8.7      22 Aug 2023 18:49        Urinalysis Basic - ( 22 Aug 2023 18:49 )    Color: x / Appearance: x / SG: x / pH: x  Gluc: 78 mg/dL / Ketone: x  / Bili: x / Urobili: x   Blood: x / Protein: x / Nitrite: x   Leuk Esterase: x / RBC: x / WBC x   Sq Epi: x / Non Sq Epi: x / Bacteria: x        CAPILLARY BLOOD GLUCOSE      POCT Blood Glucose.: 66 mg/dL (22 Aug 2023 18:19)  POCT Blood Glucose.: 85 mg/dL (22 Aug 2023 15:22)      CAPILLARY BLOOD GLUCOSE      POCT Blood Glucose.: 66 mg/dL (22 Aug 2023 18:19)  POCT Blood Glucose.: 85 mg/dL (22 Aug 2023 15:22)    CAPILLARY BLOOD GLUCOSE      POCT Blood Glucose.: 66 mg/dL (22 Aug 2023 18:19)            RECENT CULTURES:      RADIOLOGY & ADDITIONAL TESTS:      PHYSICAL EXAM:    GENERAL: NAD, well-groomed, well-developed  HEAD:  Atraumatic, Normocephalic  EYES: EOMI, PERRLA, conjunctiva and sclera clear  HEENT: Moist mucous membranes  NECK: Supple, No JVD  NERVOUS SYSTEM:  Alert & Oriented X3, Motor Strength 5/5 B/L upper and lower extremities; DTRs 2+ intact and symmetric  CHEST/LUNG: Clear to auscultation bilaterally; No rales, rhonchi, wheezing, or rubs  HEART: Regular rate and rhythm; No murmurs, rubs, or gallops  ABDOMEN: Soft, Nontender, Nondistended; Bowel sounds present  GENITOURINARY- Voiding, no palpable bladder  EXTREMITIES:  2+ Peripheral Pulses, No clubbing, cyanosis, or edema  MUSCULOSKELTAL- No muscle tenderness, Muscle tone normal, No joint tenderness, no Joint swelling, Joint range of motion-normal  SKIN-no rash, no lesion  CNS- alert, oriented X3, non focal       Daily Height in cm: 175.26 (22 Aug 2023 15:10)    Daily       acetaminophen     Tablet .. 1000 milliGRAM(s) Oral every 8 hours  aspirin 325 milliGRAM(s) Oral daily  ceFAZolin   IVPB 2000 milliGRAM(s) IV Intermittent every 8 hours  celecoxib 200 milliGRAM(s) Oral every 12 hours  HYDROmorphone  Injectable 0.5 milliGRAM(s) SubCutaneous every 4 hours PRN  lactated ringers. 1000 milliLiter(s) IV Continuous <Continuous>  losartan 50 milliGRAM(s) Oral daily  ondansetron Injectable 4 milliGRAM(s) IV Push every 6 hours PRN  oxyCODONE    IR 10 milliGRAM(s) Oral every 4 hours PRN  oxyCODONE    IR 5 milliGRAM(s) Oral every 4 hours PRN  pantoprazole    Tablet 40 milliGRAM(s) Oral daily  polyethylene glycol 3350 17 Gram(s) Oral at bedtime  prochlorperazine   Injectable 5 milliGRAM(s) IV Push every 8 hours PRN  senna 2 Tablet(s) Oral at bedtime        Assessment    Plan       CC- LT TSR    HPI:  72yo/M with PMH CAD s/p stent, HTN, hyperlipidemia, BPH, OA presented for elective LT TSR. Patient had traumatic injury many years ago and rotator cuff surgery but had persistent pain and limited ROM and scheduled for elective LT TSR. Hospitalist consulted for postop medical management    PMH- as above  PSH- b/l TKR, rotator cuff sx  Soc hx- denies smoking, no alcohol  Fam hx- m dementia    8/23/23- up and ambulating, has postop area pain, able to move fingers    Review of system- All 10 systems reviewed and is as per HPI otherwise negative.     T(C): 36.3 (08-23-23 @ 07:30), Max: 36.4 (08-23-23 @ 00:02)  HR: 65 (08-23-23 @ 07:30) (59 - 87)  BP: 126/62 (08-23-23 @ 07:30) (115/53 - 157/74)  RR: 18 (08-23-23 @ 07:30) (11 - 29)  SpO2: 97% (08-23-23 @ 07:30) (97% - 100%)  Wt(kg): --    LABS:                        14.7   10.00 )-----------( 148      ( 22 Aug 2023 18:49 )             40.8     08-22    143  |  114<H>  |  18  ----------------------------<  78  3.9   |  25  |  1.03    Ca    8.7      22 Aug 2023 18:49    Urinalysis Basic - ( 22 Aug 2023 18:49 )  Color: x / Appearance: x / SG: x / pH: x  Gluc: 78 mg/dL / Ketone: x  / Bili: x / Urobili: x   Blood: x / Protein: x / Nitrite: x   Leuk Esterase: x / RBC: x / WBC x   Sq Epi: x / Non Sq Epi: x / Bacteria: x    CAPILLARY BLOOD GLUCOSE  POCT Blood Glucose.: 66 mg/dL (22 Aug 2023 18:19)  POCT Blood Glucose.: 85 mg/dL (22 Aug 2023 15:22)    RADIOLOGY & ADDITIONAL TESTS:      PHYSICAL EXAM:  GENERAL: NAD, well-groomed, well-developed  HEAD:  Atraumatic, Normocephalic  EYES: EOMI, PERRLA, conjunctiva and sclera clear  HEENT: Moist mucous membranes  NECK: Supple, No JVD  NERVOUS SYSTEM:  Alert & Oriented X3, Motor Strength 5/5 B/L upper and lower extremities; DTRs 2+ intact and symmetric  CHEST/LUNG: Clear to auscultation bilaterally; No rales, rhonchi, wheezing, or rubs  HEART: Regular rate and rhythm; No murmurs, rubs, or gallops  ABDOMEN: Soft, Nontender, Nondistended; Bowel sounds present  GENITOURINARY- Voiding, no palpable bladder  EXTREMITIES:  2+ Peripheral Pulses, No clubbing, cyanosis, or edema  MUSCULOSKELTAL- LT shoulder dressing dry, LUE in sling  SKIN-no rash, no lesion  CNS- alert, oriented X3, non focal     Daily Height in cm: 175.26 (22 Aug 2023 15:10)      MEDICATIONS  (STANDING):  acetaminophen     Tablet .. 1000 milliGRAM(s) Oral every 8 hours  aspirin 325 milliGRAM(s) Oral daily  celecoxib 200 milliGRAM(s) Oral every 12 hours  lactated ringers. 1000 milliLiter(s) (75 mL/Hr) IV Continuous <Continuous>  losartan 50 milliGRAM(s) Oral daily  pantoprazole    Tablet 40 milliGRAM(s) Oral daily  polyethylene glycol 3350 17 Gram(s) Oral at bedtime  senna 2 Tablet(s) Oral at bedtime    MEDICATIONS  (PRN):  HYDROmorphone  Injectable 0.5 milliGRAM(s) SubCutaneous every 4 hours PRN Severe Pain (7 - 10)  ondansetron Injectable 4 milliGRAM(s) IV Push every 6 hours PRN Nausea and/or Vomiting  oxyCODONE    IR 10 milliGRAM(s) Oral every 4 hours PRN Moderate Pain (4 - 6)  oxyCODONE    IR 5 milliGRAM(s) Oral every 4 hours PRN Mild Pain (1 - 3)  prochlorperazine   Injectable 5 milliGRAM(s) IV Push every 8 hours PRN Nausea and/or Vomiting    Assessment/Plan  #LT TSR  Ortho following  Pain meds prn  Keep LUE in sling, NWB  Ambulating  Incentive spirometry    #CAD s/p stent  Cont aspirin, resume statin    #HTN- cont losartan    #BPH- no voiding difficulties    #DVT proph- per ortho team    #Dispo- thank you for consult, likely home today

## 2023-08-23 NOTE — OCCUPATIONAL THERAPY INITIAL EVALUATION ADULT - ADDITIONAL COMMENTS
Pt resides in a condo with his wife with [address] +HR on R side, one level once inside. Pt reports having a walk in shower stall, chair height toilet, no DME in bathroom. Pt reports being (I) with all ADL/IADL tasks, walked without AD, RHD.

## 2023-08-23 NOTE — OCCUPATIONAL THERAPY INITIAL EVALUATION ADULT - MD ORDER
"OT Evaluate and Treat"- MD orders received. Chart reviewed, contents noted, conferred with RN "OT Evaluate and Treat"- MD orders received. Chart reviewed, contents noted, conferred with RAE Glass, VSS: 143/73.

## 2023-08-23 NOTE — OCCUPATIONAL THERAPY INITIAL EVALUATION ADULT - PRECAUTIONS/LIMITATIONS, REHAB EVAL
diet: regular, NWB LUE, ROM limited to: full forward flexion, no extension, no abduction, IR to chest wall only, external rotation to neutral (per OT order)

## 2023-08-23 NOTE — DIETITIAN INITIAL EVALUATION ADULT - OTHER INFO
Patient is a 74 y/o Male with history of CAD (stent x 1 to LAD), HTN, HLD, Enlarged Prostate, [place] (bilateral hearing aids. who presents with a chief complaint of L reverse total Shoulder arthroplasty.    On regular diet, denies changes to appetite since admission. S/p L reverse total Shoulder arthroplasty and feeling "great" s/p procedure. UBW stated at 245# however reports of 15# intentional wt loss, stating current wt at 230#. NFPE reveals no muscle/fat wasting at this time. MST consult inappropriate. See recommendations below.

## 2023-08-23 NOTE — OCCUPATIONAL THERAPY INITIAL EVALUATION ADULT - LEVEL OF INDEPENDENCE: DRESS LOWER BODY, OT EVAL
educated on safety considerations, to use figure four techniques and perform in sitting using RUE/supervision

## 2023-08-23 NOTE — DIETITIAN INITIAL EVALUATION ADULT - PERTINENT MEDS FT
MEDICATIONS  (STANDING):  acetaminophen     Tablet .. 1000 milliGRAM(s) Oral every 8 hours  aspirin 325 milliGRAM(s) Oral daily  ceFAZolin   IVPB 2000 milliGRAM(s) IV Intermittent every 8 hours  celecoxib 200 milliGRAM(s) Oral every 12 hours  lactated ringers. 1000 milliLiter(s) (75 mL/Hr) IV Continuous <Continuous>  losartan 50 milliGRAM(s) Oral daily  pantoprazole    Tablet 40 milliGRAM(s) Oral daily  polyethylene glycol 3350 17 Gram(s) Oral at bedtime  senna 2 Tablet(s) Oral at bedtime    MEDICATIONS  (PRN):  HYDROmorphone  Injectable 0.5 milliGRAM(s) SubCutaneous every 4 hours PRN Severe Pain (7 - 10)  ondansetron Injectable 4 milliGRAM(s) IV Push every 6 hours PRN Nausea and/or Vomiting  oxyCODONE    IR 10 milliGRAM(s) Oral every 4 hours PRN Moderate Pain (4 - 6)  oxyCODONE    IR 5 milliGRAM(s) Oral every 4 hours PRN Mild Pain (1 - 3)  prochlorperazine   Injectable 5 milliGRAM(s) IV Push every 8 hours PRN Nausea and/or Vomiting

## 2023-08-23 NOTE — OCCUPATIONAL THERAPY INITIAL EVALUATION ADULT - PERTINENT HX OF CURRENT PROBLEM, REHAB EVAL
Pt is a 72 y/o male with PMHx of OA and arthrosis of left shoulder who presents to  for an elective procedure. Pt is now s/p L rTSA.

## 2023-08-23 NOTE — OCCUPATIONAL THERAPY INITIAL EVALUATION ADULT - ADL RETRAINING, OT EVAL
Pt will perform UE dressing, LE dressing, footwear management, and toileting with MI in 3-5 tx sessions.

## 2023-08-23 NOTE — DIETITIAN INITIAL EVALUATION ADULT - ADD RECOMMEND
1) C/w regular diet  2) Encourage protein-rich foods, maximize food preferences  3) Monitor bowel movements, c/w current bowel regimens  4) MVI w/ minerals daily to ensure 100% RDA met  5) Monitor daily lytes/min and replete prn   6) Obtain weekly wt to track/trend changes   RD will continue to monitor PO intake, labs, hydration, and wt prn.

## 2023-08-23 NOTE — DIETITIAN INITIAL EVALUATION ADULT - ORAL INTAKE PTA/DIET HISTORY
Lives at home w/ his wife, no difficulties with cooking/ grocery shopping reported. States appetite PTA was good, started to cut out snacks and watch portion sizes which resulted in 15# intentional wt loss. Denies ONS use.

## 2023-08-23 NOTE — OCCUPATIONAL THERAPY INITIAL EVALUATION ADULT - NSACTIVITYREC_GEN_A_OT
Pt educated on weight bearing precautions, ROM precautions, how to don/doff sling, compensatory dressing techniques for top/bottoms/footwear, safety recommendations with higher level ADL tasks, having family assist and perform IADL tasks. Pt able to go up/down FOS using R HR on eval (I) supervision assist. Recommend home with family assistance for higher level ADL tasks such as showering and future outpatient services once cleared by MD.

## 2023-08-23 NOTE — OCCUPATIONAL THERAPY INITIAL EVALUATION ADULT - RANGE OF MOTION EXAMINATION
RUE: WNL, LUE: not assessed proximally 2* s/p rTSA, distally at wrist/hand AROM WFL, pt with no AROM at elbow yet- reports his block is still wearing off

## 2023-08-23 NOTE — OCCUPATIONAL THERAPY INITIAL EVALUATION ADULT - GENERAL OBSERVATIONS, REHAB EVAL
Pt rec'd semi-supine in bed, bed alarmed, CB/TT/phone IR, +IV infusing, +sling LUE, agreeable to OT IE.

## 2023-08-23 NOTE — PROVIDER CONTACT NOTE (OTHER) - SITUATION
Pt RUE red, raised, hot and "burning" at IV site. IV site is patent and flushing. Pt is getting vanco per orders.

## 2023-08-30 ENCOUNTER — APPOINTMENT (OUTPATIENT)
Dept: ORTHOPEDIC SURGERY | Facility: CLINIC | Age: 73
End: 2023-08-30
Payer: OTHER MISCELLANEOUS

## 2023-08-30 VITALS — BODY MASS INDEX: 32.9 KG/M2 | WEIGHT: 235 LBS | HEIGHT: 71 IN

## 2023-08-30 DIAGNOSIS — M19.012 PRIMARY OSTEOARTHRITIS, LEFT SHOULDER: ICD-10-CM

## 2023-08-30 PROBLEM — M51.26 OTHER INTERVERTEBRAL DISC DISPLACEMENT, LUMBAR REGION: Chronic | Status: ACTIVE | Noted: 2023-08-11

## 2023-08-30 PROBLEM — E78.5 HYPERLIPIDEMIA, UNSPECIFIED: Chronic | Status: ACTIVE | Noted: 2023-08-11

## 2023-08-30 PROBLEM — E66.9 OBESITY, UNSPECIFIED: Chronic | Status: ACTIVE | Noted: 2023-08-11

## 2023-08-30 PROBLEM — I10 ESSENTIAL (PRIMARY) HYPERTENSION: Chronic | Status: ACTIVE | Noted: 2023-08-11

## 2023-08-30 PROBLEM — I25.10 ATHEROSCLEROTIC HEART DISEASE OF NATIVE CORONARY ARTERY WITHOUT ANGINA PECTORIS: Chronic | Status: ACTIVE | Noted: 2023-08-11

## 2023-08-30 PROBLEM — Z87.898 PERSONAL HISTORY OF OTHER SPECIFIED CONDITIONS: Chronic | Status: ACTIVE | Noted: 2023-08-11

## 2023-08-30 PROBLEM — C43.9 MALIGNANT MELANOMA OF SKIN, UNSPECIFIED: Chronic | Status: ACTIVE | Noted: 2023-08-11

## 2023-08-30 PROBLEM — H53.001 UNSPECIFIED AMBLYOPIA, RIGHT EYE: Chronic | Status: ACTIVE | Noted: 2023-08-11

## 2023-08-30 PROBLEM — M19.90 UNSPECIFIED OSTEOARTHRITIS, UNSPECIFIED SITE: Chronic | Status: ACTIVE | Noted: 2023-08-11

## 2023-08-30 PROCEDURE — 99024 POSTOP FOLLOW-UP VISIT: CPT

## 2023-08-30 NOTE — HISTORY OF PRESENT ILLNESS
[Work related] : work related [5] : 5 [6] : 6 [Dull/Aching] : dull/aching [Sharp] : sharp [Not working due to injury] : Work status: not working due to injury [] : Post Surgical Visit: yes [FreeTextEntry1] : Lt. ldr [FreeTextEntry3] : 11/13/1998 [FreeTextEntry5] : Pt is a 72 y/o RHD M here to follow up on his L shldr 8 days s/p L RTSR on 8/22/23 due to a work injury in 1998. Pt states recovery is going well with moderate pain levels  [de-identified] : lifting  [de-identified] : JIMI  [de-identified] : 8/22/23 [de-identified] : L RTSR

## 2023-08-30 NOTE — ASSESSMENT
[FreeTextEntry1] : The patient is s/p 8 days from a left shoulder reverse total shoulder replacement on 8/22/23. The patient denies fever, chills, CP, SOB. The patient denies drainage or discharge from their incision. The patient is doing well postoperatively. He denies new trauma. He denies paresthesias or numbness. The patient is wearing his sling as discussed. The patient is starting HEP and will begin PT after next week. He uses pain medications occasionally but is doing well overall.  Left shoulder exam: The patient presents with no apparent distress. Neurovascularly intact. Sensation intact to left upper extremity. No scars, rashes, or abrasions. 2+ pulses to the distal radius. Non-tender to palpation. Axillary patch sensation intact. No ROM or strength tested due to recent surgery.   The patient will return in 4-5 weeks. We will get 5 views of the left shoulder. He will begin gentle PT. He will use oral AIs as needed.

## 2023-08-31 PROBLEM — G47.33 OBSTRUCTIVE SLEEP APNEA (ADULT) (PEDIATRIC): Chronic | Status: ACTIVE | Noted: 2023-08-11

## 2023-08-31 PROBLEM — J30.89 OTHER ALLERGIC RHINITIS: Chronic | Status: ACTIVE | Noted: 2023-08-11

## 2023-08-31 PROBLEM — Z86.010 PERSONAL HISTORY OF COLONIC POLYPS: Chronic | Status: ACTIVE | Noted: 2023-08-11

## 2023-08-31 PROBLEM — H93.13 TINNITUS, BILATERAL: Chronic | Status: ACTIVE | Noted: 2023-08-11

## 2023-08-31 PROBLEM — M19.012 PRIMARY OSTEOARTHRITIS, LEFT SHOULDER: Chronic | Status: ACTIVE | Noted: 2023-08-11

## 2023-08-31 PROBLEM — N40.0 BENIGN PROSTATIC HYPERPLASIA WITHOUT LOWER URINARY TRACT SYMPTOMS: Chronic | Status: ACTIVE | Noted: 2023-08-11

## 2023-08-31 PROBLEM — H91.90 UNSPECIFIED HEARING LOSS, UNSPECIFIED EAR: Chronic | Status: ACTIVE | Noted: 2023-08-11

## 2023-09-01 LAB — SURGICAL PATHOLOGY STUDY: SIGNIFICANT CHANGE UP

## 2023-09-05 DIAGNOSIS — I10 ESSENTIAL (PRIMARY) HYPERTENSION: ICD-10-CM

## 2023-09-05 DIAGNOSIS — E78.5 HYPERLIPIDEMIA, UNSPECIFIED: ICD-10-CM

## 2023-09-05 DIAGNOSIS — M12.512 TRAUMATIC ARTHROPATHY, LEFT SHOULDER: ICD-10-CM

## 2023-09-05 DIAGNOSIS — N40.0 BENIGN PROSTATIC HYPERPLASIA WITHOUT LOWER URINARY TRACT SYMPTOMS: ICD-10-CM

## 2023-09-05 DIAGNOSIS — I25.10 ATHEROSCLEROTIC HEART DISEASE OF NATIVE CORONARY ARTERY WITHOUT ANGINA PECTORIS: ICD-10-CM

## 2023-09-05 DIAGNOSIS — M12.812 OTHER SPECIFIC ARTHROPATHIES, NOT ELSEWHERE CLASSIFIED, LEFT SHOULDER: ICD-10-CM

## 2023-09-05 DIAGNOSIS — Z95.5 PRESENCE OF CORONARY ANGIOPLASTY IMPLANT AND GRAFT: ICD-10-CM

## 2023-10-02 ENCOUNTER — APPOINTMENT (OUTPATIENT)
Dept: ORTHOPEDIC SURGERY | Facility: CLINIC | Age: 73
End: 2023-10-02
Payer: OTHER MISCELLANEOUS

## 2023-10-02 ENCOUNTER — RESULT CHARGE (OUTPATIENT)
Age: 73
End: 2023-10-02

## 2023-10-02 VITALS — HEIGHT: 71 IN | WEIGHT: 230 LBS | BODY MASS INDEX: 32.2 KG/M2

## 2023-10-02 PROCEDURE — 73030 X-RAY EXAM OF SHOULDER: CPT | Mod: LT

## 2023-10-02 PROCEDURE — 99024 POSTOP FOLLOW-UP VISIT: CPT

## 2023-10-02 PROCEDURE — 73010 X-RAY EXAM OF SHOULDER BLADE: CPT | Mod: LT

## 2023-10-02 PROCEDURE — ZZZZZ: CPT

## 2023-10-16 NOTE — PATIENT PROFILE ADULT - NSPROGENOTHERPROVIDER_GEN_A_NUR
Patient scheduled at [facility].  pre orders needed. Surgery orders needed. Please and thank you.   none

## 2023-11-27 NOTE — DISCHARGE NOTE PROVIDER - NSDCCPGOAL_GEN_ALL_CORE_FT
Improved pain, Improved function, Return to ADLs  Peng Advancement Flap Text: The defect edges were debeveled with a #15 scalpel blade. Given the location of the defect, shape of the defect and the proximity to free margins a Peng advancement flap was deemed most appropriate. Using a sterile surgical marker, an appropriate advancement flap was drawn incorporating the defect and placing the expected incisions within the relaxed skin tension lines where possible. The area thus outlined was incised deep to adipose tissue with a #15 scalpel blade. The skin margins were undermined to an appropriate distance in all directions utilizing iris scissors. Following this, the designed flap was advanced and carried over into the primary defect and sutured into place.

## 2023-12-04 ENCOUNTER — APPOINTMENT (OUTPATIENT)
Dept: ORTHOPEDIC SURGERY | Facility: CLINIC | Age: 73
End: 2023-12-04
Payer: OTHER MISCELLANEOUS

## 2023-12-04 VITALS — HEIGHT: 71 IN | BODY MASS INDEX: 32.2 KG/M2 | WEIGHT: 230 LBS

## 2023-12-04 DIAGNOSIS — M75.122 COMPLETE ROTATOR CUFF TEAR OR RUPTURE OF LEFT SHOULDER, NOT SPECIFIED AS TRAUMATIC: ICD-10-CM

## 2023-12-04 PROCEDURE — 99213 OFFICE O/P EST LOW 20 MIN: CPT

## 2023-12-05 PROBLEM — M75.122 COMPLETE TEAR OF LEFT ROTATOR CUFF, UNSPECIFIED WHETHER TRAUMATIC: Status: ACTIVE | Noted: 2023-02-21

## 2024-03-18 ENCOUNTER — APPOINTMENT (OUTPATIENT)
Dept: ORTHOPEDIC SURGERY | Facility: CLINIC | Age: 74
End: 2024-03-18
Payer: OTHER MISCELLANEOUS

## 2024-03-18 VITALS — HEIGHT: 71 IN | BODY MASS INDEX: 33.6 KG/M2 | WEIGHT: 240 LBS

## 2024-03-18 DIAGNOSIS — M19.112 POST-TRAUMATIC OSTEOARTHRITIS, LEFT SHOULDER: ICD-10-CM

## 2024-03-18 PROCEDURE — 99213 OFFICE O/P EST LOW 20 MIN: CPT

## 2024-03-19 PROBLEM — M19.112: Status: ACTIVE | Noted: 2023-03-19

## 2024-03-19 NOTE — HISTORY OF PRESENT ILLNESS
[Work related] : work related [7] : 7 [Dull/Aching] : dull/aching [4] : 4 [Intermittent] : intermittent [Sharp] : sharp [Rest] : rest [Exercising] : exercising [Meds] : meds [Not working due to injury] : Work status: not working due to injury [] : yes [FreeTextEntry3] : 11/13/1998 [FreeTextEntry5] : 72 y/o M presents for PO #4 eval Lt. shldr today. s/p L RTSR on DOS noted above. Pt reports pain levels remain minimal except last night he states it kept him up all night.   [de-identified] : lifting  [de-identified] : JIMI  [de-identified] : 8/22/23 [de-identified] : L RTSR

## 2024-03-19 NOTE — ASSESSMENT
[FreeTextEntry1] : The patient is s/p 7 months from a left shoulder reverse total shoulder replacement on 8/22/23. The patient is doing well postoperatively. He denies new trauma. He denies paresthesias or numbness. The patient has stopped PT and is doing well with ADLs. He reports posterior shoulder pain that woke him up over the past few nights. He reports no pain during the day. He did play golf twice in the past week and may attribute it to his swing. He did not fall or injure the shoulder in any way. He has tried minimal treatments for this at this time. He denies any loss of function or strength.  Left shoulder exam: The patient presents with no apparent distress. Neurovascularly intact. Sensation intact to left upper extremity. No scars, rashes, or abrasions. 2+ pulses to the distal radius. Non-tender to palpation. AROM  ABD 90 ER 55 IR L5. 5/5 Deltoid strength. 4/5 RC strength.  The patient is doing very well postoperatively. He denies new trauma. He denies paresthesias. The patient will use oral AIs and voltaren gel as needed. He will ice after golf at this time.

## 2024-06-10 ENCOUNTER — APPOINTMENT (OUTPATIENT)
Dept: ORTHOPEDIC SURGERY | Facility: CLINIC | Age: 74
End: 2024-06-10
Payer: MEDICARE

## 2024-06-10 VITALS — BODY MASS INDEX: 33.6 KG/M2 | HEIGHT: 71 IN | WEIGHT: 240 LBS

## 2024-06-10 PROCEDURE — 72100 X-RAY EXAM L-S SPINE 2/3 VWS: CPT

## 2024-06-10 PROCEDURE — 99214 OFFICE O/P EST MOD 30 MIN: CPT

## 2024-06-10 PROCEDURE — 72170 X-RAY EXAM OF PELVIS: CPT

## 2024-06-10 RX ORDER — METHYLPREDNISOLONE 4 MG/1
4 TABLET ORAL
Qty: 1 | Refills: 0 | Status: ACTIVE | COMMUNITY
Start: 2024-06-10 | End: 1900-01-01

## 2024-06-13 ENCOUNTER — APPOINTMENT (OUTPATIENT)
Dept: MRI IMAGING | Facility: CLINIC | Age: 74
End: 2024-06-13
Payer: MEDICARE

## 2024-06-13 PROCEDURE — 72148 MRI LUMBAR SPINE W/O DYE: CPT | Mod: MH

## 2024-06-17 NOTE — PHYSICAL EXAM
[] : no ecchymosis [FreeTextEntry1] : xray of the L spine degenerative scoliosis and disc space narrowing  [de-identified] : xray of the pelvis revealed no evidence of fx or subluxation

## 2024-06-17 NOTE — HISTORY OF PRESENT ILLNESS
[de-identified] : Here for pain in the lower back for about 3 months or so. Pain seems to be in the right cheek, down the leg. Worse when he is sitting long. Had some intermittent numbness in the leg. Tried Chiro but not too much relief.

## 2024-06-17 NOTE — DISCUSSION/SUMMARY
[de-identified] : The patient's condition is acute Confounding medical conditions/concerns: Tests/Studies Independently Interpreted Today:    xray of the L spine degenerative scoliosis and disc space narrowing                                                                                  xray of the pelvis revealed no evidence of fx or subluxation ------------------------------------------------------------------------------------------------------------------   Due to worsening pain and instability with mechanical symptoms, recommend the patient obtain MRI L -spine to rule out HNP. Follow up after MRI to possibly rule out surgical pathology and discuss future treatment options.      Prescribed patient MDP. Use as directed for the next five days.  Following which OTC NSAIDs may be used PRN for pain, inflammation, and discomfort.  No NSAID medications should be taken concurrently with the Medrol Dose Pack.   Plan for PT at professional   Rec f/u with pain management    I, Nishi Ko, attest that this documentation has been prepared under the direction and in the presence of Provider Dr. Ad Cunningham

## 2024-06-28 ENCOUNTER — APPOINTMENT (OUTPATIENT)
Dept: PAIN MANAGEMENT | Facility: CLINIC | Age: 74
End: 2024-06-28
Payer: MEDICARE

## 2024-06-28 VITALS — HEIGHT: 71 IN | BODY MASS INDEX: 31.78 KG/M2 | WEIGHT: 227 LBS

## 2024-06-28 DIAGNOSIS — M54.16 RADICULOPATHY, LUMBAR REGION: ICD-10-CM

## 2024-06-28 PROCEDURE — 99204 OFFICE O/P NEW MOD 45 MIN: CPT

## 2024-07-18 ENCOUNTER — APPOINTMENT (OUTPATIENT)
Dept: PAIN MANAGEMENT | Facility: CLINIC | Age: 74
End: 2024-07-18
Payer: MEDICARE

## 2024-07-18 DIAGNOSIS — M54.16 RADICULOPATHY, LUMBAR REGION: ICD-10-CM

## 2024-07-18 PROCEDURE — 64483 NJX AA&/STRD TFRM EPI L/S 1: CPT | Mod: RT

## 2024-07-18 PROCEDURE — 64484 NJX AA&/STRD TFRM EPI L/S EA: CPT | Mod: 59,RT

## 2024-08-09 ENCOUNTER — APPOINTMENT (OUTPATIENT)
Dept: PAIN MANAGEMENT | Facility: CLINIC | Age: 74
End: 2024-08-09

## 2024-08-09 PROBLEM — M79.18 MYALGIA, OTHER SITE: Status: ACTIVE | Noted: 2024-08-09

## 2024-08-09 PROCEDURE — 99214 OFFICE O/P EST MOD 30 MIN: CPT | Mod: 25

## 2024-08-09 PROCEDURE — 20552 NJX 1/MLT TRIGGER POINT 1/2: CPT

## 2024-08-09 PROCEDURE — J3490M: CUSTOM | Mod: NC

## 2024-08-09 NOTE — PHYSICAL EXAM
[de-identified] : PHYSICAL EXAM  Constitutional:  Appears well, no apparent distress Ability to communicate: Normal Respiratory: non-labored breathing Skin: no rash noted Head: normocephalic, atraumatic Neck: no visible thyroid enlargement Eyes: extraocular movements intact Neurologic: alert and oriented x3 Psychiatric: normal mood, affect, and behavior  Lumbar Spine:  Palpation: right lumbar paraspinal spasm and right lumbar paraspinal tenderness to palpation. ROM: Diminished range of motion in all plains.  Patient notes pain with lateral bending to the right. MMT: Motor exam is 5/5 through out bilateral lower extremities. Sensation: Light touch and pain is intact throughout bilateral lower extremities. Reflexes: achilles and patella reflexes are intact and  symmetrical.  No sustained clonus. Special Testing: Positive straight leg raise on the right side.  Assessemnt: Radiculopathy of lumbosacral region (M54.17) Myalgia (M79.10)
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
## 2024-08-09 NOTE — PROCEDURE
[Trigger point 1-2 muscle groups] : trigger point 1-2 muscle groups [Right] : of the right [Lumbar paraspinal muscle] : lumbar paraspinal muscle [Gluteus Erica muscle] : gluteus erica muscle [Pain] : pain [Inflammation] : inflammation [Alcohol] : alcohol [Ethyl Chloride sprayed topically] : ethyl chloride sprayed topically [Sterile technique used] : sterile technique used [___ cc    1%] : Lidocaine ~Vcc of 1%  [___ cc    0.25%] : Bupivacaine (Marcaine) ~Vcc of 0.25%  [___ cc    10mg] : Triamcinolone (Kenalog) ~Vcc of 10 mg  [] : Patient tolerated procedure well [Call if redness, pain or fever occur] : call if redness, pain or fever occur [Risks, benefits, alternatives discussed / Verbal consent obtained] : the risks benefits, and alternatives have been discussed, and verbal consent was obtained

## 2024-08-09 NOTE — HISTORY OF PRESENT ILLNESS
[Lower back] : lower back [Burning] : burning [Radiating] : radiating [Sharp] : sharp [Shooting] : shooting [Stabbing] : stabbing [Tightness] : tightness [Frequent] : frequent [Household chores] : household chores [Leisure] : leisure [Sleep] : sleep [Social interactions] : social interactions [Sitting] : sitting [Walking] : walking [Lying in bed] : lying in bed [6] : 6 [Injection therapy] : injection therapy [FreeTextEntry1] : RT L3-4 ,L4-5 TFESI-7/18/2024 [] : no [FreeTextEntry7] : right side , right buttocks  [de-identified] : for too long , or driving

## 2024-10-25 ENCOUNTER — APPOINTMENT (OUTPATIENT)
Dept: PAIN MANAGEMENT | Facility: CLINIC | Age: 74
End: 2024-10-25
Payer: MEDICARE

## 2024-10-25 VITALS — BODY MASS INDEX: 31.78 KG/M2 | HEIGHT: 71 IN | WEIGHT: 227 LBS

## 2024-10-25 DIAGNOSIS — M54.17 RADICULOPATHY, LUMBOSACRAL REGION: ICD-10-CM

## 2024-10-25 PROCEDURE — 99214 OFFICE O/P EST MOD 30 MIN: CPT

## 2024-10-25 RX ORDER — DIAZEPAM 5 MG/1
5 TABLET ORAL
Qty: 2 | Refills: 0 | Status: ACTIVE | COMMUNITY
Start: 2024-10-25 | End: 1900-01-01

## 2024-11-08 ENCOUNTER — APPOINTMENT (OUTPATIENT)
Dept: PAIN MANAGEMENT | Facility: CLINIC | Age: 74
End: 2024-11-08
Payer: MEDICARE

## 2024-11-08 DIAGNOSIS — M54.17 RADICULOPATHY, LUMBOSACRAL REGION: ICD-10-CM

## 2024-11-08 PROCEDURE — 62323 NJX INTERLAMINAR LMBR/SAC: CPT

## 2024-11-11 ENCOUNTER — NON-APPOINTMENT (OUTPATIENT)
Age: 74
End: 2024-11-11

## 2024-11-22 ENCOUNTER — APPOINTMENT (OUTPATIENT)
Dept: PAIN MANAGEMENT | Facility: CLINIC | Age: 74
End: 2024-11-22

## 2025-02-07 ENCOUNTER — APPOINTMENT (OUTPATIENT)
Dept: PAIN MANAGEMENT | Facility: CLINIC | Age: 75
End: 2025-02-07
Payer: MEDICARE

## 2025-02-07 VITALS — BODY MASS INDEX: 32.76 KG/M2 | HEIGHT: 71 IN | WEIGHT: 234 LBS

## 2025-02-07 PROCEDURE — 99214 OFFICE O/P EST MOD 30 MIN: CPT

## 2025-02-14 ENCOUNTER — APPOINTMENT (OUTPATIENT)
Dept: PAIN MANAGEMENT | Facility: CLINIC | Age: 75
End: 2025-02-14
Payer: MEDICARE

## 2025-02-14 DIAGNOSIS — M54.17 RADICULOPATHY, LUMBOSACRAL REGION: ICD-10-CM

## 2025-02-14 PROCEDURE — 62323 NJX INTERLAMINAR LMBR/SAC: CPT

## 2025-03-07 ENCOUNTER — APPOINTMENT (OUTPATIENT)
Dept: PAIN MANAGEMENT | Facility: CLINIC | Age: 75
End: 2025-03-07
Payer: MEDICARE

## 2025-03-07 DIAGNOSIS — M54.17 RADICULOPATHY, LUMBOSACRAL REGION: ICD-10-CM

## 2025-03-07 PROCEDURE — 99213 OFFICE O/P EST LOW 20 MIN: CPT
